# Patient Record
Sex: MALE | Race: WHITE | NOT HISPANIC OR LATINO | Employment: OTHER | URBAN - METROPOLITAN AREA
[De-identification: names, ages, dates, MRNs, and addresses within clinical notes are randomized per-mention and may not be internally consistent; named-entity substitution may affect disease eponyms.]

---

## 2017-03-01 ENCOUNTER — TELEPHONE (OUTPATIENT)
Dept: INTERNAL MEDICINE | Facility: CLINIC | Age: 67
End: 2017-03-01

## 2017-03-01 DIAGNOSIS — Z00.00 ANNUAL PHYSICAL EXAM: Primary | ICD-10-CM

## 2017-03-01 NOTE — TELEPHONE ENCOUNTER
----- Message from Melissa Varela sent at 3/1/2017 10:27 AM CST -----  Contact: fyi  Doctor appointment and lab have been scheduled.  Please link lab orders to the lab appointment.  Date of doctor appointment:  05/31/17  Physical or EP: epp   Date of lab appointment:  05/30/17  Comments:

## 2017-04-18 DIAGNOSIS — F41.9 ANXIETY: ICD-10-CM

## 2017-04-18 DIAGNOSIS — Z00.00 ANNUAL PHYSICAL EXAM: Primary | ICD-10-CM

## 2017-04-18 RX ORDER — CITALOPRAM 20 MG/1
TABLET, FILM COATED ORAL
Qty: 90 TABLET | Refills: 0 | Status: SHIPPED | OUTPATIENT
Start: 2017-04-18 | End: 2017-07-10 | Stop reason: SDUPTHER

## 2017-05-08 ENCOUNTER — TELEPHONE (OUTPATIENT)
Dept: INTERNAL MEDICINE | Facility: CLINIC | Age: 67
End: 2017-05-08

## 2017-05-08 ENCOUNTER — PATIENT MESSAGE (OUTPATIENT)
Dept: ADMINISTRATIVE | Facility: OTHER | Age: 67
End: 2017-05-08

## 2017-05-08 NOTE — TELEPHONE ENCOUNTER
----- Message from Shanda Anne sent at 5/8/2017  3:36 PM CDT -----  Contact: Self [phone]  Pt would like to speak with the nurse regarding his book put pt states he can not wait until the next available which is 06/13,Please call

## 2017-05-22 ENCOUNTER — LAB VISIT (OUTPATIENT)
Dept: LAB | Facility: HOSPITAL | Age: 67
End: 2017-05-22
Attending: INTERNAL MEDICINE
Payer: MEDICARE

## 2017-05-22 DIAGNOSIS — Z00.00 ANNUAL PHYSICAL EXAM: ICD-10-CM

## 2017-05-22 DIAGNOSIS — Z11.59 NEED FOR HEPATITIS C SCREENING TEST: ICD-10-CM

## 2017-05-22 LAB
ALBUMIN SERPL BCP-MCNC: 3.5 G/DL
ALP SERPL-CCNC: 56 U/L
ALT SERPL W/O P-5'-P-CCNC: 30 U/L
ANION GAP SERPL CALC-SCNC: 7 MMOL/L
AST SERPL-CCNC: 23 U/L
BASOPHILS # BLD AUTO: 0.04 K/UL
BASOPHILS NFR BLD: 0.8 %
BILIRUB SERPL-MCNC: 1 MG/DL
BUN SERPL-MCNC: 18 MG/DL
CALCIUM SERPL-MCNC: 9 MG/DL
CHLORIDE SERPL-SCNC: 108 MMOL/L
CHOLEST/HDLC SERPL: 4.2 {RATIO}
CO2 SERPL-SCNC: 28 MMOL/L
COMPLEXED PSA SERPL-MCNC: 4.7 NG/ML
CREAT SERPL-MCNC: 1.2 MG/DL
DIFFERENTIAL METHOD: NORMAL
EOSINOPHIL # BLD AUTO: 0.2 K/UL
EOSINOPHIL NFR BLD: 3.2 %
ERYTHROCYTE [DISTWIDTH] IN BLOOD BY AUTOMATED COUNT: 12.4 %
EST. GFR  (AFRICAN AMERICAN): >60 ML/MIN/1.73 M^2
EST. GFR  (NON AFRICAN AMERICAN): >60 ML/MIN/1.73 M^2
GLUCOSE SERPL-MCNC: 110 MG/DL
HCT VFR BLD AUTO: 43.4 %
HCV AB SERPL QL IA: NEGATIVE
HDL/CHOLESTEROL RATIO: 23.8 %
HDLC SERPL-MCNC: 189 MG/DL
HDLC SERPL-MCNC: 45 MG/DL
HGB BLD-MCNC: 14.5 G/DL
LDLC SERPL CALC-MCNC: 113.4 MG/DL
LYMPHOCYTES # BLD AUTO: 1.5 K/UL
LYMPHOCYTES NFR BLD: 27.6 %
MCH RBC QN AUTO: 30.7 PG
MCHC RBC AUTO-ENTMCNC: 33.4 %
MCV RBC AUTO: 92 FL
MONOCYTES # BLD AUTO: 0.5 K/UL
MONOCYTES NFR BLD: 9.6 %
NEUTROPHILS # BLD AUTO: 3.1 K/UL
NEUTROPHILS NFR BLD: 58.6 %
NONHDLC SERPL-MCNC: 144 MG/DL
PLATELET # BLD AUTO: 155 K/UL
PMV BLD AUTO: 11.5 FL
POTASSIUM SERPL-SCNC: 4.7 MMOL/L
PROT SERPL-MCNC: 6.7 G/DL
RBC # BLD AUTO: 4.72 M/UL
SODIUM SERPL-SCNC: 143 MMOL/L
TRIGL SERPL-MCNC: 153 MG/DL
TSH SERPL DL<=0.005 MIU/L-ACNC: 1.77 UIU/ML
WBC # BLD AUTO: 5.29 K/UL

## 2017-05-22 PROCEDURE — 86803 HEPATITIS C AB TEST: CPT

## 2017-05-22 PROCEDURE — 85025 COMPLETE CBC W/AUTO DIFF WBC: CPT

## 2017-05-22 PROCEDURE — 84153 ASSAY OF PSA TOTAL: CPT

## 2017-05-22 PROCEDURE — 80053 COMPREHEN METABOLIC PANEL: CPT

## 2017-05-22 PROCEDURE — 80061 LIPID PANEL: CPT

## 2017-05-22 PROCEDURE — 84443 ASSAY THYROID STIM HORMONE: CPT

## 2017-05-22 PROCEDURE — 36415 COLL VENOUS BLD VENIPUNCTURE: CPT | Mod: PO

## 2017-06-02 ENCOUNTER — OFFICE VISIT (OUTPATIENT)
Dept: INTERNAL MEDICINE | Facility: CLINIC | Age: 67
End: 2017-06-02
Payer: MEDICARE

## 2017-06-02 VITALS
HEIGHT: 72 IN | WEIGHT: 177.5 LBS | DIASTOLIC BLOOD PRESSURE: 76 MMHG | RESPIRATION RATE: 12 BRPM | BODY MASS INDEX: 24.04 KG/M2 | TEMPERATURE: 98 F | SYSTOLIC BLOOD PRESSURE: 104 MMHG | HEART RATE: 56 BPM

## 2017-06-02 DIAGNOSIS — Z00.00 ANNUAL PHYSICAL EXAM: Primary | ICD-10-CM

## 2017-06-02 DIAGNOSIS — F41.9 ANXIETY: ICD-10-CM

## 2017-06-02 DIAGNOSIS — R97.20 ELEVATED PSA: ICD-10-CM

## 2017-06-02 PROCEDURE — 99213 OFFICE O/P EST LOW 20 MIN: CPT | Mod: PBBFAC,PO | Performed by: INTERNAL MEDICINE

## 2017-06-02 PROCEDURE — 99397 PER PM REEVAL EST PAT 65+ YR: CPT | Mod: S$PBB,,, | Performed by: INTERNAL MEDICINE

## 2017-06-02 PROCEDURE — 99999 PR PBB SHADOW E&M-EST. PATIENT-LVL III: CPT | Mod: PBBFAC,,, | Performed by: INTERNAL MEDICINE

## 2017-06-02 PROCEDURE — 90670 PCV13 VACCINE IM: CPT | Mod: PBBFAC,PO

## 2017-06-02 NOTE — PROGRESS NOTES
Subjective:       Patient ID: Nilesh Almaraz is a 66 y.o. male.    Chief Complaint: Annual Exam    HPI   66 y.o. Male here for annual exam.      Cholesterol: (normal)  Vaccines: Influenza (2015); Tetanus (declined); PNA (2017);  Zoster (2015)  Eye exam: 2016  Colonoscopy: 5/17- repeat in 5 years     Exercise: cardio 2-3x/wk  Diet: regular     Past Medical History:    Allergy                                                       Anxiety                                                       Elevated PSA                                                Past Surgical History:    FRACTURE SURGERY                                                 Comment:left ankle  Social History    Marital Status:              Spouse Name: Palmira                Years of Education:                 Number of children: 2              Occupational History  Occupation          Employer            Comment               [employer]                                   Social History Main Topics    Smoking Status: Never Smoker                      Smokeless Status: Never Used                        Alcohol Use: Yes           1.2 - 1.8 oz/week       2-3 Glasses of wine per week    Drug Use: No              Sexual Activity: Yes               Partners with: Female      -- Penicillins     --  Other reaction(s): Hives  Mr. Almaraz does not currently have medications on file.     Review of Systems   Constitutional: Negative for activity change, appetite change, chills, diaphoresis, fatigue, fever and unexpected weight change.   HENT: Positive for hearing loss. Negative for congestion, mouth sores, postnasal drip, rhinorrhea, sinus pressure, sneezing, sore throat, trouble swallowing and voice change.    Eyes: Negative for discharge, itching and visual disturbance.   Respiratory: Negative for cough, chest tightness, shortness of breath and wheezing.    Cardiovascular: Negative for chest pain, palpitations and leg swelling.   Gastrointestinal:  Negative for abdominal pain, blood in stool, constipation, diarrhea, nausea and vomiting.   Endocrine: Negative for cold intolerance, heat intolerance, polydipsia and polyuria.   Genitourinary: Negative for difficulty urinating, dysuria, flank pain, hematuria and urgency.   Musculoskeletal: Positive for arthralgias. Negative for back pain, joint swelling, myalgias and neck pain.   Skin: Negative for rash and wound.   Allergic/Immunologic: Negative for environmental allergies and food allergies.   Neurological: Negative for dizziness, tremors, seizures, syncope, weakness and headaches.   Hematological: Negative for adenopathy. Does not bruise/bleed easily.   Psychiatric/Behavioral: Negative for confusion, dysphoric mood, self-injury, sleep disturbance and suicidal ideas. The patient is nervous/anxious.        Objective:      Physical Exam   Constitutional: He is oriented to person, place, and time. He appears well-developed and well-nourished. No distress.   HENT:   Head: Normocephalic and atraumatic.   Right Ear: External ear normal.   Left Ear: External ear normal.   Nose: Nose normal.   Mouth/Throat: Oropharynx is clear and moist. No oropharyngeal exudate.   Eyes: Conjunctivae and EOM are normal. Pupils are equal, round, and reactive to light. Right eye exhibits no discharge. Left eye exhibits no discharge. No scleral icterus.   Neck: Normal range of motion. Neck supple. No JVD present. No thyromegaly present.   Cardiovascular: Normal rate, regular rhythm, normal heart sounds and intact distal pulses.    No murmur heard.  Pulmonary/Chest: Effort normal and breath sounds normal. No respiratory distress. He has no wheezes. He has no rales.   Abdominal: Soft. Bowel sounds are normal. He exhibits no distension. There is no tenderness. There is no guarding.   Musculoskeletal: He exhibits no edema.   Lymphadenopathy:     He has no cervical adenopathy.   Neurological: He is alert and oriented to person, place, and time.  He has normal reflexes. No cranial nerve deficit. Coordination normal.   Skin: Skin is warm and dry. No rash noted. He is not diaphoretic. No pallor.   Psychiatric: He has a normal mood and affect. Judgment normal.       Assessment:       1. Annual physical exam    2. Anxiety    3. Elevated PSA        Plan:    1. Blood work reviewed with pt and is satisfactory       Vaccines: Influenza (2015); Tetanus (declined); PNA (2017); Zoster (2015)       Eye exam: 2016       Colonoscopy: 5/17- repeat in 5 years   2. Anxiety- controlled on Celexa 20 mg daily   3. Elevated PSA- followed by Urology       Hx of negative Bx in 2012   4. F/u in 1 yr or PRN

## 2017-07-10 DIAGNOSIS — F41.9 ANXIETY: ICD-10-CM

## 2017-07-10 RX ORDER — CITALOPRAM 20 MG/1
TABLET, FILM COATED ORAL
Qty: 30 TABLET | Refills: 11 | Status: SHIPPED | OUTPATIENT
Start: 2017-07-10 | End: 2017-07-12 | Stop reason: SDUPTHER

## 2017-07-12 DIAGNOSIS — F41.9 ANXIETY: ICD-10-CM

## 2017-07-12 RX ORDER — CITALOPRAM 20 MG/1
TABLET, FILM COATED ORAL
Qty: 90 TABLET | Refills: 3 | Status: SHIPPED | OUTPATIENT
Start: 2017-07-12 | End: 2018-10-01 | Stop reason: SDUPTHER

## 2017-12-04 ENCOUNTER — CLINICAL SUPPORT (OUTPATIENT)
Dept: INFECTIOUS DISEASES | Facility: CLINIC | Age: 67
End: 2017-12-04
Payer: MEDICARE

## 2017-12-04 ENCOUNTER — OFFICE VISIT (OUTPATIENT)
Dept: INFECTIOUS DISEASES | Facility: CLINIC | Age: 67
End: 2017-12-04
Payer: MEDICARE

## 2017-12-04 VITALS
TEMPERATURE: 98 F | HEART RATE: 60 BPM | DIASTOLIC BLOOD PRESSURE: 69 MMHG | WEIGHT: 177.69 LBS | HEIGHT: 72 IN | BODY MASS INDEX: 24.07 KG/M2 | SYSTOLIC BLOOD PRESSURE: 109 MMHG

## 2017-12-04 DIAGNOSIS — Z23 IMMUNIZATION DUE: ICD-10-CM

## 2017-12-04 DIAGNOSIS — Z71.84 TRAVEL ADVICE ENCOUNTER: Primary | ICD-10-CM

## 2017-12-04 DIAGNOSIS — Z71.84 TRAVEL ADVICE ENCOUNTER: ICD-10-CM

## 2017-12-04 PROCEDURE — 90717 YELLOW FEVER VACCINE SUBQ: CPT | Mod: PBBFAC

## 2017-12-04 PROCEDURE — 99213 OFFICE O/P EST LOW 20 MIN: CPT | Mod: PBBFAC,27,25 | Performed by: INTERNAL MEDICINE

## 2017-12-04 PROCEDURE — 99402 PREV MED CNSL INDIV APPRX 30: CPT | Mod: S$PBB,,, | Performed by: INTERNAL MEDICINE

## 2017-12-04 PROCEDURE — 90472 IMMUNIZATION ADMIN EACH ADD: CPT | Mod: PBBFAC

## 2017-12-04 PROCEDURE — 99999 PR PBB SHADOW E&M-EST. PATIENT-LVL I: CPT | Mod: PBBFAC,,,

## 2017-12-04 PROCEDURE — 90691 TYPHOID VACCINE IM: CPT | Mod: PBBFAC

## 2017-12-04 PROCEDURE — 99999 PR PBB SHADOW E&M-EST. PATIENT-LVL III: CPT | Mod: PBBFAC,,, | Performed by: INTERNAL MEDICINE

## 2017-12-04 PROCEDURE — 90632 HEPA VACCINE ADULT IM: CPT | Mod: PBBFAC

## 2017-12-04 PROCEDURE — 90714 TD VACC NO PRESV 7 YRS+ IM: CPT | Mod: PBBFAC

## 2017-12-04 PROCEDURE — 99211 OFF/OP EST MAY X REQ PHY/QHP: CPT | Mod: PBBFAC

## 2017-12-04 RX ORDER — AZITHROMYCIN 500 MG/1
TABLET, FILM COATED ORAL
Qty: 2 TABLET | Refills: 0 | Status: SHIPPED | OUTPATIENT
Start: 2017-12-04 | End: 2018-11-14 | Stop reason: ALTCHOICE

## 2017-12-04 RX ORDER — ATOVAQUONE AND PROGUANIL HYDROCHLORIDE 250; 100 MG/1; MG/1
TABLET, FILM COATED ORAL
Qty: 11 TABLET | Refills: 0 | Status: SHIPPED | OUTPATIENT
Start: 2017-12-04 | End: 2018-11-14 | Stop reason: ALTCHOICE

## 2017-12-04 RX ORDER — SCOLOPAMINE TRANSDERMAL SYSTEM 1 MG/1
1 PATCH, EXTENDED RELEASE TRANSDERMAL
Qty: 3 PATCH | Refills: 0 | Status: SHIPPED | OUTPATIENT
Start: 2017-12-04 | End: 2018-01-03

## 2017-12-04 NOTE — PROGRESS NOTES
Patient received PF Td, Hep A, Typhoid Vi, and Stamaril yellow fever w/yellow card documentation. Tolerated well and left in NAD

## 2018-05-10 ENCOUNTER — OFFICE VISIT (OUTPATIENT)
Dept: URGENT CARE | Facility: CLINIC | Age: 68
End: 2018-05-10
Payer: MEDICARE

## 2018-05-10 VITALS
HEART RATE: 60 BPM | HEIGHT: 72 IN | WEIGHT: 171 LBS | OXYGEN SATURATION: 97 % | BODY MASS INDEX: 23.16 KG/M2 | TEMPERATURE: 99 F | DIASTOLIC BLOOD PRESSURE: 73 MMHG | RESPIRATION RATE: 18 BRPM | SYSTOLIC BLOOD PRESSURE: 120 MMHG

## 2018-05-10 DIAGNOSIS — B96.89 ACUTE BACTERIAL SINUSITIS: Primary | ICD-10-CM

## 2018-05-10 DIAGNOSIS — J01.90 ACUTE BACTERIAL SINUSITIS: Primary | ICD-10-CM

## 2018-05-10 PROCEDURE — 96372 THER/PROPH/DIAG INJ SC/IM: CPT | Mod: S$GLB,,, | Performed by: INTERNAL MEDICINE

## 2018-05-10 PROCEDURE — 99213 OFFICE O/P EST LOW 20 MIN: CPT | Mod: 25,S$GLB,, | Performed by: INTERNAL MEDICINE

## 2018-05-10 RX ORDER — DOXYCYCLINE 100 MG/1
100 CAPSULE ORAL 2 TIMES DAILY
Qty: 20 CAPSULE | Refills: 0 | Status: SHIPPED | OUTPATIENT
Start: 2018-05-10 | End: 2018-05-20

## 2018-05-10 RX ORDER — BETAMETHASONE SODIUM PHOSPHATE AND BETAMETHASONE ACETATE 3; 3 MG/ML; MG/ML
9 INJECTION, SUSPENSION INTRA-ARTICULAR; INTRALESIONAL; INTRAMUSCULAR; SOFT TISSUE ONCE
Status: COMPLETED | OUTPATIENT
Start: 2018-05-10 | End: 2018-05-10

## 2018-05-10 RX ADMIN — BETAMETHASONE SODIUM PHOSPHATE AND BETAMETHASONE ACETATE 9 MG: 3; 3 INJECTION, SUSPENSION INTRA-ARTICULAR; INTRALESIONAL; INTRAMUSCULAR; SOFT TISSUE at 10:05

## 2018-05-10 NOTE — PROGRESS NOTES
Subjective:       Patient ID: Nilesh Almaraz is a 67 y.o. male.    Vitals:  height is 6' (1.829 m) and weight is 77.6 kg (171 lb). His temperature is 98.9 °F (37.2 °C). His blood pressure is 120/73 and his pulse is 60. His respiration is 18 and oxygen saturation is 97%.     Chief Complaint: Sinus Problem (Started 2 weeks ago. with coughing semi productive, sore throat and ear pain )    Sinus Problem   This is a new problem. The current episode started 1 to 4 weeks ago. The problem is unchanged. There has been no fever. Associated symptoms include coughing, ear pain, headaches and a sore throat. Pertinent negatives include no chills, congestion, hoarse voice or shortness of breath. The treatment provided no relief.     Review of Systems   Constitution: Negative for chills, fever and malaise/fatigue.   HENT: Positive for ear pain, hearing loss and sore throat. Negative for congestion and hoarse voice.    Eyes: Negative for discharge and redness.   Cardiovascular: Negative for chest pain, dyspnea on exertion and leg swelling.   Respiratory: Positive for cough and sputum production. Negative for shortness of breath and wheezing.    Musculoskeletal: Negative for myalgias.   Gastrointestinal: Negative for abdominal pain and nausea.   Neurological: Positive for headaches.       Objective:      Physical Exam   Constitutional: He appears well-developed and well-nourished.   HENT:   Head: Normocephalic and atraumatic.   Nose: Mucosal edema (purulent mucous) present. Right sinus exhibits maxillary sinus tenderness. Left sinus exhibits maxillary sinus tenderness.   Eyes: Conjunctivae and EOM are normal. Pupils are equal, round, and reactive to light.   Neck: Normal range of motion. Neck supple.   Cardiovascular: Normal rate and regular rhythm.    Pulmonary/Chest: Effort normal and breath sounds normal.   Nursing note and vitals reviewed.      Assessment:       1. Acute bacterial sinusitis        Plan:         Acute bacterial  sinusitis  -     betamethasone acetate-betamethasone sodium phosphate injection 9 mg; Inject 1.5 mLs (9 mg total) into the muscle once.  -     doxycycline (VIBRAMYCIN) 100 MG Cap; Take 1 capsule (100 mg total) by mouth 2 (two) times daily.  Dispense: 20 capsule; Refill: 0

## 2018-07-02 ENCOUNTER — PATIENT MESSAGE (OUTPATIENT)
Dept: ADMINISTRATIVE | Facility: HOSPITAL | Age: 68
End: 2018-07-02

## 2018-07-13 ENCOUNTER — CLINICAL SUPPORT (OUTPATIENT)
Dept: INFECTIOUS DISEASES | Facility: CLINIC | Age: 68
End: 2018-07-13
Payer: MEDICARE

## 2018-07-13 DIAGNOSIS — Z71.84 TRAVEL ADVICE ENCOUNTER: ICD-10-CM

## 2018-07-13 DIAGNOSIS — Z23 IMMUNIZATION DUE: ICD-10-CM

## 2018-07-13 PROCEDURE — 99999 PR PBB SHADOW E&M-EST. PATIENT-LVL I: CPT | Mod: PBBFAC,,,

## 2018-07-13 PROCEDURE — 90632 HEPA VACCINE ADULT IM: CPT | Mod: PBBFAC

## 2018-07-13 PROCEDURE — 99211 OFF/OP EST MAY X REQ PHY/QHP: CPT | Mod: PBBFAC

## 2018-10-01 DIAGNOSIS — F41.9 ANXIETY: ICD-10-CM

## 2018-10-01 RX ORDER — CITALOPRAM 20 MG/1
20 TABLET, FILM COATED ORAL DAILY
Qty: 90 TABLET | Refills: 0 | Status: SHIPPED | OUTPATIENT
Start: 2018-10-01 | End: 2018-12-08 | Stop reason: SDUPTHER

## 2018-10-01 NOTE — LETTER
October 1, 2018    Nilesh Almaraz  [address] [address]             [facility]  [address] [facility] [address]  Phone: [phone]  Fax: [phone] Dear Mr. Almaraz:    We recently refilled your Celexa. Dr. Aguero says you are due for your Annual visit with fasting labs . Please call to schedule @ [phone], phone staff would be happy to assist you.    If you have any questions or concerns, please don't hesitate to call.    Sincerely,    Ke Aguero DO  [facility]

## 2018-10-10 ENCOUNTER — PATIENT MESSAGE (OUTPATIENT)
Dept: INTERNAL MEDICINE | Facility: CLINIC | Age: 68
End: 2018-10-10

## 2018-10-11 ENCOUNTER — TELEPHONE (OUTPATIENT)
Dept: INTERNAL MEDICINE | Facility: CLINIC | Age: 68
End: 2018-10-11

## 2018-10-11 DIAGNOSIS — E78.5 DYSLIPIDEMIA: ICD-10-CM

## 2018-10-11 DIAGNOSIS — R53.83 FATIGUE, UNSPECIFIED TYPE: Primary | ICD-10-CM

## 2018-10-11 DIAGNOSIS — Z12.5 PROSTATE CANCER SCREENING: ICD-10-CM

## 2018-10-11 NOTE — TELEPHONE ENCOUNTER
----- Message from Loulou Aly sent at 10/11/2018 11:13 AM CDT -----  Doctor appointment and lab have been scheduled.  Please link lab orders to the lab appointment.  Date of doctor appointment:11/14    Physical or EP:  Physical  Date of lab appointment:  11/07  Comments:

## 2018-10-11 NOTE — TELEPHONE ENCOUNTER
"----- Message from Veronica Brower sent at 10/11/2018  9:28 AM CDT -----  Contact: Self [phone]  RX request - refill or new RX.  Is this a refill or new RX:  refill  RX name and strength: Citalopram 20mg  Directions:   Is this a 30 day or 90 day RX:  9 pills  Local pharmacy or mail order pharmacy:    Pharmacy name and phone # (DON'T enter "on file" or "in chart"): [facility]/pharmacy #[id number] [address] [phone] (Phone)  [phone] (Fax)  Comments:          "

## 2018-11-08 ENCOUNTER — LAB VISIT (OUTPATIENT)
Dept: LAB | Facility: HOSPITAL | Age: 68
End: 2018-11-08
Attending: INTERNAL MEDICINE
Payer: MEDICARE

## 2018-11-08 DIAGNOSIS — R53.83 FATIGUE, UNSPECIFIED TYPE: ICD-10-CM

## 2018-11-08 DIAGNOSIS — Z12.5 PROSTATE CANCER SCREENING: ICD-10-CM

## 2018-11-08 DIAGNOSIS — E78.5 DYSLIPIDEMIA: ICD-10-CM

## 2018-11-08 LAB
ALBUMIN SERPL BCP-MCNC: 3.7 G/DL
ALP SERPL-CCNC: 60 U/L
ALT SERPL W/O P-5'-P-CCNC: 43 U/L
ANION GAP SERPL CALC-SCNC: 5 MMOL/L
AST SERPL-CCNC: 29 U/L
BASOPHILS # BLD AUTO: 0.04 K/UL
BASOPHILS NFR BLD: 0.8 %
BILIRUB SERPL-MCNC: 0.8 MG/DL
BUN SERPL-MCNC: 14 MG/DL
CALCIUM SERPL-MCNC: 9.5 MG/DL
CHLORIDE SERPL-SCNC: 105 MMOL/L
CHOLEST SERPL-MCNC: 223 MG/DL
CHOLEST/HDLC SERPL: 4.1 {RATIO}
CO2 SERPL-SCNC: 30 MMOL/L
COMPLEXED PSA SERPL-MCNC: 7 NG/ML
CREAT SERPL-MCNC: 1 MG/DL
DIFFERENTIAL METHOD: NORMAL
EOSINOPHIL # BLD AUTO: 0.1 K/UL
EOSINOPHIL NFR BLD: 2 %
ERYTHROCYTE [DISTWIDTH] IN BLOOD BY AUTOMATED COUNT: 12.2 %
EST. GFR  (AFRICAN AMERICAN): >60 ML/MIN/1.73 M^2
EST. GFR  (NON AFRICAN AMERICAN): >60 ML/MIN/1.73 M^2
GLUCOSE SERPL-MCNC: 107 MG/DL
HCT VFR BLD AUTO: 44.6 %
HDLC SERPL-MCNC: 54 MG/DL
HDLC SERPL: 24.2 %
HGB BLD-MCNC: 14.8 G/DL
IMM GRANULOCYTES # BLD AUTO: 0.01 K/UL
IMM GRANULOCYTES NFR BLD AUTO: 0.2 %
LDLC SERPL CALC-MCNC: 135.2 MG/DL
LYMPHOCYTES # BLD AUTO: 1.5 K/UL
LYMPHOCYTES NFR BLD: 29.6 %
MCH RBC QN AUTO: 30.3 PG
MCHC RBC AUTO-ENTMCNC: 33.2 G/DL
MCV RBC AUTO: 91 FL
MONOCYTES # BLD AUTO: 0.4 K/UL
MONOCYTES NFR BLD: 8.9 %
NEUTROPHILS # BLD AUTO: 2.9 K/UL
NEUTROPHILS NFR BLD: 58.5 %
NONHDLC SERPL-MCNC: 169 MG/DL
NRBC BLD-RTO: 0 /100 WBC
PLATELET # BLD AUTO: 171 K/UL
PMV BLD AUTO: 11 FL
POTASSIUM SERPL-SCNC: 4.5 MMOL/L
PROT SERPL-MCNC: 7.3 G/DL
RBC # BLD AUTO: 4.89 M/UL
SODIUM SERPL-SCNC: 140 MMOL/L
TRIGL SERPL-MCNC: 169 MG/DL
TSH SERPL DL<=0.005 MIU/L-ACNC: 1.61 UIU/ML
WBC # BLD AUTO: 4.93 K/UL

## 2018-11-08 PROCEDURE — 85025 COMPLETE CBC W/AUTO DIFF WBC: CPT

## 2018-11-08 PROCEDURE — 80053 COMPREHEN METABOLIC PANEL: CPT

## 2018-11-08 PROCEDURE — 84443 ASSAY THYROID STIM HORMONE: CPT

## 2018-11-08 PROCEDURE — 36415 COLL VENOUS BLD VENIPUNCTURE: CPT | Mod: PO

## 2018-11-08 PROCEDURE — 84153 ASSAY OF PSA TOTAL: CPT

## 2018-11-08 PROCEDURE — 80061 LIPID PANEL: CPT

## 2018-11-14 ENCOUNTER — OFFICE VISIT (OUTPATIENT)
Dept: INTERNAL MEDICINE | Facility: CLINIC | Age: 68
End: 2018-11-14
Payer: MEDICARE

## 2018-11-14 VITALS
SYSTOLIC BLOOD PRESSURE: 118 MMHG | WEIGHT: 169.75 LBS | DIASTOLIC BLOOD PRESSURE: 78 MMHG | HEART RATE: 64 BPM | RESPIRATION RATE: 16 BRPM | TEMPERATURE: 99 F | BODY MASS INDEX: 22.99 KG/M2 | HEIGHT: 72 IN

## 2018-11-14 DIAGNOSIS — N40.1 BENIGN PROSTATIC HYPERPLASIA WITH WEAK URINARY STREAM: ICD-10-CM

## 2018-11-14 DIAGNOSIS — R39.12 BENIGN PROSTATIC HYPERPLASIA WITH WEAK URINARY STREAM: ICD-10-CM

## 2018-11-14 DIAGNOSIS — Z00.00 ANNUAL PHYSICAL EXAM: Primary | ICD-10-CM

## 2018-11-14 DIAGNOSIS — R97.20 ELEVATED PSA: ICD-10-CM

## 2018-11-14 DIAGNOSIS — F41.9 ANXIETY: ICD-10-CM

## 2018-11-14 PROCEDURE — 90732 PPSV23 VACC 2 YRS+ SUBQ/IM: CPT | Mod: PBBFAC,PO

## 2018-11-14 PROCEDURE — 99999 PR PBB SHADOW E&M-EST. PATIENT-LVL III: CPT | Mod: PBBFAC,,, | Performed by: INTERNAL MEDICINE

## 2018-11-14 PROCEDURE — 90662 IIV NO PRSV INCREASED AG IM: CPT | Mod: PBBFAC,PO

## 2018-11-14 PROCEDURE — 99213 OFFICE O/P EST LOW 20 MIN: CPT | Mod: PBBFAC,PO,25 | Performed by: INTERNAL MEDICINE

## 2018-11-14 PROCEDURE — 99214 OFFICE O/P EST MOD 30 MIN: CPT | Mod: S$PBB,,, | Performed by: INTERNAL MEDICINE

## 2018-11-14 RX ORDER — TAMSULOSIN HYDROCHLORIDE 0.4 MG/1
0.4 CAPSULE ORAL DAILY
Qty: 90 CAPSULE | Refills: 3 | Status: SHIPPED | OUTPATIENT
Start: 2018-11-14 | End: 2019-06-06 | Stop reason: SDUPTHER

## 2018-11-14 NOTE — PROGRESS NOTES
Subjective:       Patient ID: Nilesh Almaraz is a 67 y.o. male.    Chief Complaint: Annual Exam (see labs to review,  0 pain today.)    HPI   67 y.o. Male here for annual exam.      Vaccines: Influenza (2018); Tetanus (declined); Prevnar (2017); Pneumococcal (2018); Zoster (2015)  Eye exam: 2018  Colonoscopy: 5/17- repeat in 5 years     Exercise: cardio 2-3x/wk  Diet: regular     Past Medical History:    Allergy                                                       Anxiety                                                       Elevated PSA                                                Past Surgical History:    FRACTURE SURGERY                                                 Comment:left ankle  Social History    Marital Status:              Spouse Name: Palmira                Years of Education:                 Number of children: 2              Occupational History  Occupation          Employer            Comment               [employer]                                   Social History Main Topics    Smoking Status: Never Smoker                      Smokeless Status: Never Used                        Alcohol Use: Yes           1.2 - 1.8 oz/week       2-3 Glasses of wine per week    Drug Use: No              Sexual Activity: Yes               Partners with: Female      -- Penicillins     --  Other reaction(s): Hives  Review of Systems   Constitutional: Negative for activity change, appetite change, chills, diaphoresis, fatigue, fever and unexpected weight change.   HENT: Negative for congestion, mouth sores, postnasal drip, rhinorrhea, sinus pressure, sneezing, sore throat, trouble swallowing and voice change.    Eyes: Negative for discharge, itching and visual disturbance.   Respiratory: Negative for cough, chest tightness, shortness of breath and wheezing.    Cardiovascular: Negative for chest pain, palpitations and leg swelling.   Gastrointestinal: Negative for abdominal pain, blood in stool,  constipation, diarrhea, nausea and vomiting.   Endocrine: Negative for cold intolerance and heat intolerance.   Genitourinary: Positive for decreased urine volume. Negative for difficulty urinating, dysuria, flank pain, hematuria and urgency.   Musculoskeletal: Negative for arthralgias, back pain, myalgias and neck pain.   Skin: Negative for rash and wound.   Allergic/Immunologic: Negative for environmental allergies and food allergies.   Neurological: Negative for dizziness, tremors, seizures, syncope, weakness and headaches.   Hematological: Negative for adenopathy. Does not bruise/bleed easily.   Psychiatric/Behavioral: Negative for confusion, sleep disturbance and suicidal ideas. The patient is not nervous/anxious.        Objective:      Physical Exam   Constitutional: He is oriented to person, place, and time. He appears well-developed and well-nourished. No distress.   HENT:   Head: Normocephalic and atraumatic.   Right Ear: External ear normal.   Left Ear: External ear normal.   Nose: Nose normal.   Mouth/Throat: Oropharynx is clear and moist. No oropharyngeal exudate.   Eyes: Conjunctivae and EOM are normal. Pupils are equal, round, and reactive to light. Right eye exhibits no discharge. Left eye exhibits no discharge. No scleral icterus.   Neck: Normal range of motion. Neck supple. No JVD present. No thyromegaly present.   Cardiovascular: Normal rate, regular rhythm, normal heart sounds and intact distal pulses.   No murmur heard.  Pulmonary/Chest: Effort normal and breath sounds normal. No respiratory distress. He has no wheezes. He has no rales.   Abdominal: Soft. Bowel sounds are normal. He exhibits no distension. There is no tenderness. There is no guarding.   Musculoskeletal: He exhibits no edema.   Lymphadenopathy:     He has no cervical adenopathy.   Neurological: He is alert and oriented to person, place, and time. No cranial nerve deficit. Coordination normal.   Skin: Skin is warm and dry. No rash  noted. He is not diaphoretic. No pallor.   Psychiatric: He has a normal mood and affect. Judgment normal.       Assessment:       1. Annual physical exam    2. Anxiety    3. Elevated PSA    4. Benign prostatic hyperplasia with weak urinary stream        Plan:    1. Blood work reviewed with pt and is satisfactory       Vaccines: Influenza (2018); Tetanus (declined); Prevnar (2017); Pneumococcal (2018); Zoster (2015)       Eye exam: 2018       Colonoscopy: 5/17- repeat in 5 years   2. Anxiety- controlled on Celexa 20 mg daily   3. Elevated PSA- pt has seen Urology       Hx of negative Bx in 2012   4. Rx Flomax 0.4 mg daily    5. F/u in 1 yr or PRN

## 2018-11-15 ENCOUNTER — OFFICE VISIT (OUTPATIENT)
Dept: UROLOGY | Facility: CLINIC | Age: 68
End: 2018-11-15
Payer: MEDICARE

## 2018-11-15 VITALS
SYSTOLIC BLOOD PRESSURE: 119 MMHG | HEART RATE: 66 BPM | WEIGHT: 169.75 LBS | HEIGHT: 72 IN | DIASTOLIC BLOOD PRESSURE: 69 MMHG | BODY MASS INDEX: 22.99 KG/M2

## 2018-11-15 DIAGNOSIS — D49.59 NEOPLASM OF PROSTATE: ICD-10-CM

## 2018-11-15 DIAGNOSIS — R97.20 ELEVATED PSA: Primary | ICD-10-CM

## 2018-11-15 PROCEDURE — 99213 OFFICE O/P EST LOW 20 MIN: CPT | Mod: PBBFAC | Performed by: UROLOGY

## 2018-11-15 PROCEDURE — 99214 OFFICE O/P EST MOD 30 MIN: CPT | Mod: S$PBB,,, | Performed by: UROLOGY

## 2018-11-15 PROCEDURE — 99999 PR PBB SHADOW E&M-EST. PATIENT-LVL III: CPT | Mod: PBBFAC,,, | Performed by: UROLOGY

## 2018-11-15 NOTE — LETTER
November 15, 2018      Ke Aguero, DO  [address] [facility] [place] [address]           [facility]  [address]  [place] [address]  Phone: [phone]          Patient: Nilesh Almaraz   MR Number: [id number]   YOB: 1950   Date of Visit: 11/15/2018       Dear Dr. Ke Aguero:    Thank you for referring Nilesh Almaraz to me for evaluation. Attached you will find relevant portions of my assessment and plan of care.    If you have questions, please do not hesitate to call me. I look forward to following Nilesh Almaraz along with you.    Sincerely,    Roman Mcdonough MD    Enclosure  CC:  No Recipients    If you would like to receive this communication electronically, please contact [email or web address] or [phone] to request more information on [facility] access.    For providers and/or their staff who would like to refer a patient to [facility], please contact us through our one-stop-shop provider referral line, [facility] , at [phone].    If you feel you have received this communication in error or would no longer like to receive these types of communications, please e-mail [email or web address]

## 2018-11-15 NOTE — PROGRESS NOTES
Clinic Note  11/15/2018      Subjective:         Chief Complaint:   HPI  Nilesh Almaraz is a 67 y.o. male with an elevated PSA. Consult from Dr. Aguero.  I did biopsy 2/9/2012- B9 path with acute and chronic inflammation(PSA 3.5).  Negative family history. 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 Has place in [place].    NIH risk tool:        Lab Results   Component Value Date    PSA 7.0 (H) 11/08/2018    PSA 4.7 (H) 05/22/2017    PSA 6.8 (H) 03/29/2016    PSA 5.0 (H) 10/16/2013    PSA 3.52 09/27/2012    PSA 3.4 08/04/2011    PSA 4.5 (H) 07/20/2011    PSADIAG 4.3 (H) 10/03/2016    PSATOTAL 3.5 08/04/2011    PSAFREE 0.83 08/04/2011    PSAFREEPCT 23.71 08/04/2011      Past Medical History:   Diagnosis Date    Allergy     Anxiety     BPH (benign prostatic hypertrophy)     Elevated PSA     Prostatism     Skin cancer      Family History   Problem Relation Age of Onset    Cancer Brother         osteo sarcoma    Diabetes Mother     Colon polyps Neg Hx     Heart disease Neg Hx     Amblyopia Neg Hx     Blindness Neg Hx     Cataracts Neg Hx     Glaucoma Neg Hx     Hypertension Neg Hx     Macular degeneration Neg Hx     Retinal detachment Neg Hx     Strabismus Neg Hx     Stroke Neg Hx     Thyroid disease Neg Hx      Social History     Socioeconomic History    Marital status:      Spouse name: Palmira    Number of children: 2    Years of education: Not on file    Highest education level: Not on file   Social Needs    Financial resource strain: Not on file    Food insecurity - worry: Not on file    Food insecurity - inability: Not on file    Transportation needs - medical: Not on file    Transportation needs - non-medical: Not on file   Occupational History    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     Employer: [employer]   Tobacco Use    Smoking status: Never Smoker    Smokeless tobacco: Never Used   Substance and Sexual Activity    Alcohol use: Yes     Alcohol/week: 1.2 - 1.8 oz      Types: 2 - 3 Glasses of wine per week     Comment: daily    Drug use: No    Sexual activity: Yes     Partners: Female   Other Topics Concern    Not on file   Social History Narrative    Not on file     Past Surgical History:   Procedure Laterality Date    FRACTURE SURGERY      left ankle    HERNIA REPAIR      hydrocele      SKIN LESION EXCISION       Patient Active Problem List   Diagnosis    Anxiety    History of elevated PSA    Elevated PSA     Review of Systems   Constitutional: Negative for appetite change, chills, fatigue, fever and unexpected weight change.   HENT: Negative for nosebleeds.    Respiratory: Negative for shortness of breath and wheezing.    Cardiovascular: Negative for chest pain, palpitations and leg swelling.   Gastrointestinal: Negative for abdominal distention, abdominal pain, constipation, diarrhea, nausea and vomiting.   Genitourinary: Positive for difficulty urinating. Negative for dysuria and hematuria.        Decreased FOS   Musculoskeletal: Negative for arthralgias and back pain.   Skin: Negative for pallor.   Neurological: Negative for dizziness, seizures and syncope.   Hematological: Negative for adenopathy.   Psychiatric/Behavioral: Negative for dysphoric mood.         Objective:      There were no vitals taken for this visit.  Estimated body mass index is 23.02 kg/m² as calculated from the following:    Height as of 11/14/18: 6' (1.829 m).    Weight as of 11/14/18: 77 kg (169 lb 12.1 oz).  Physical Exam   Constitutional: He is oriented to person, place, and time. He appears well-developed and well-nourished. No distress.   HENT:   Head: Atraumatic.   Neck: No tracheal deviation present.   Cardiovascular: Normal rate.    Pulmonary/Chest: Effort normal. No respiratory distress. He has no wheezes.   Abdominal: Soft. Bowel sounds are normal. He exhibits no distension and no mass. There is no tenderness. There is no rebound and no guarding.   Genitourinary: Rectum normal  and prostate normal. Rectal exam shows no external hemorrhoid, no internal hemorrhoid, no fissure, no mass and no tenderness.   Neurological: He is alert and oriented to person, place, and time.   Skin: Skin is warm and dry. He is not diaphoretic.     Psychiatric: He has a normal mood and affect. His behavior is normal. Judgment and thought content normal.         Assessment and Plan:           Problem List Items Addressed This Visit     Elevated PSA - Primary          Follow up:   Discussed PSA, NIH risk. Discussed surveillance, mp MRI, TRUS biopsy.  Recommend mp MRI.    Roman Mcdonough

## 2018-11-26 ENCOUNTER — HOSPITAL ENCOUNTER (OUTPATIENT)
Dept: RADIOLOGY | Facility: HOSPITAL | Age: 68
Discharge: HOME | End: 2018-11-26
Attending: UROLOGY
Payer: MEDICARE

## 2018-11-26 DIAGNOSIS — D49.59 NEOPLASM OF PROSTATE: ICD-10-CM

## 2018-11-26 PROCEDURE — 72197 MRI PELVIS W/O & W/DYE: CPT | Mod: TC

## 2018-11-26 PROCEDURE — A9585 GADOBUTROL INJECTION: HCPCS | Performed by: UROLOGY

## 2018-11-26 PROCEDURE — 72197 MRI PELVIS W/O & W/DYE: CPT | Mod: 26,,, | Performed by: RADIOLOGY

## 2018-11-26 PROCEDURE — 25500020 PHARM REV CODE 255: Performed by: UROLOGY

## 2018-11-26 RX ORDER — GADOBUTROL 604.72 MG/ML
10 INJECTION INTRAVENOUS
Status: COMPLETED | OUTPATIENT
Start: 2018-11-26 | End: 2018-11-26

## 2018-11-26 RX ADMIN — GADOBUTROL 10 ML: 604.72 INJECTION INTRAVENOUS at 03:11

## 2018-11-27 ENCOUNTER — OFFICE VISIT (OUTPATIENT)
Dept: UROLOGY | Facility: CLINIC | Age: 68
End: 2018-11-27
Payer: MEDICARE

## 2018-11-27 VITALS
DIASTOLIC BLOOD PRESSURE: 66 MMHG | SYSTOLIC BLOOD PRESSURE: 136 MMHG | WEIGHT: 163.13 LBS | BODY MASS INDEX: 22.13 KG/M2 | HEART RATE: 57 BPM

## 2018-11-27 DIAGNOSIS — R97.20 ELEVATED PSA: Primary | ICD-10-CM

## 2018-11-27 PROCEDURE — 99213 OFFICE O/P EST LOW 20 MIN: CPT | Mod: PBBFAC | Performed by: UROLOGY

## 2018-11-27 PROCEDURE — 99213 OFFICE O/P EST LOW 20 MIN: CPT | Mod: S$PBB,,, | Performed by: UROLOGY

## 2018-11-27 PROCEDURE — 99999 PR PBB SHADOW E&M-EST. PATIENT-LVL III: CPT | Mod: PBBFAC,,, | Performed by: UROLOGY

## 2018-11-27 NOTE — PROGRESS NOTES
Clinic Note  11/27/2018      Subjective:         Chief Complaint:   HPI  Nilesh Almaraz is a 67 y.o. male  with an elevated PSA. Consult from Dr. Aguero.  I did biopsy 2/9/2012- B9 path with acute and chronic inflammation(PSA 3.5).  Negative family history. 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  On flomax for BPH since mid November has noted improvement in stream.     NIH risk tool:     MRI: No suspicious PI-RADS 4 or 5 lesions. Volume- 52 ccs.Report:  PIRADS 2 lesion at the left posteromedial prostate base peripheral zone measuring approximately 1.7 cm.      Lab Results   Component Value Date    PSA 7.0 (H) 11/08/2018    PSA 4.7 (H) 05/22/2017    PSA 6.8 (H) 03/29/2016    PSA 5.0 (H) 10/16/2013    PSA 3.52 09/27/2012    PSA 3.4 08/04/2011    PSA 4.5 (H) 07/20/2011    PSADIAG 4.3 (H) 10/03/2016    PSATOTAL 3.5 08/04/2011    PSAFREE 0.83 08/04/2011    PSAFREEPCT 23.71 08/04/2011      Past Medical History:   Diagnosis Date    Allergy     Anxiety     BPH (benign prostatic hypertrophy)     Elevated PSA     Prostatism     Skin cancer      Family History   Problem Relation Age of Onset    Cancer Brother         osteo sarcoma    Diabetes Mother     Colon polyps Neg Hx     Heart disease Neg Hx     Amblyopia Neg Hx     Blindness Neg Hx     Cataracts Neg Hx     Glaucoma Neg Hx     Hypertension Neg Hx     Macular degeneration Neg Hx     Retinal detachment Neg Hx     Strabismus Neg Hx     Stroke Neg Hx     Thyroid disease Neg Hx      Social History     Socioeconomic History    Marital status:      Spouse name: Palmira    Number of children: 2    Years of education: Not on file    Highest education level: Not on file   Social Needs    Financial resource strain: Not on file    Food insecurity - worry: Not on file    Food insecurity - inability: Not on file    Transportation needs - medical: Not on file    Transportation needs - non-medical: Not on  file   Occupational History    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     Employer: [employer]   Tobacco Use    Smoking status: Never Smoker    Smokeless tobacco: Never Used   Substance and Sexual Activity    Alcohol use: Yes     Alcohol/week: 1.2 - 1.8 oz     Types: 2 - 3 Glasses of wine per week     Comment: daily    Drug use: No    Sexual activity: Yes     Partners: Female   Other Topics Concern    Not on file   Social History Narrative    Not on file     Past Surgical History:   Procedure Laterality Date    FRACTURE SURGERY      left ankle    HERNIA REPAIR      hydrocele      SKIN LESION EXCISION       Patient Active Problem List   Diagnosis    Anxiety    Elevated PSA     Review of Systems      Objective:      There were no vitals taken for this visit.  Estimated body mass index is 23.02 kg/m² as calculated from the following:    Height as of 11/15/18: 6' (1.829 m).    Weight as of 11/15/18: 77 kg (169 lb 12.1 oz).  Physical Exam      Assessment and Plan:           Problem List Items Addressed This Visit     Elevated PSA - Primary          Follow up:   Long discussion about BPH management. Prostate cancer screening.  6 months with [facility].    Roman Mcdonough

## 2018-12-08 DIAGNOSIS — F41.9 ANXIETY: ICD-10-CM

## 2018-12-10 RX ORDER — CITALOPRAM 20 MG/1
TABLET, FILM COATED ORAL
Qty: 90 TABLET | Refills: 3 | Status: SHIPPED | OUTPATIENT
Start: 2018-12-10 | End: 2019-05-07 | Stop reason: SDUPTHER

## 2019-02-11 LAB
LEFT EYE DM RETINOPATHY: NORMAL
LEFT EYE DM RETINOPATHY: NORMAL
RIGHT EYE DM RETINOPATHY: NORMAL
RIGHT EYE DM RETINOPATHY: NORMAL

## 2019-04-18 ENCOUNTER — TELEPHONE (OUTPATIENT)
Dept: ADMINISTRATIVE | Facility: HOSPITAL | Age: 69
End: 2019-04-18

## 2019-04-25 ENCOUNTER — TELEPHONE (OUTPATIENT)
Dept: ADMINISTRATIVE | Facility: HOSPITAL | Age: 69
End: 2019-04-25

## 2019-05-07 DIAGNOSIS — F41.9 ANXIETY: ICD-10-CM

## 2019-05-07 RX ORDER — CITALOPRAM 20 MG/1
20 TABLET, FILM COATED ORAL DAILY
Qty: 90 TABLET | Refills: 3 | Status: SHIPPED | OUTPATIENT
Start: 2019-05-07 | End: 2019-08-20 | Stop reason: SDUPTHER

## 2019-05-07 NOTE — TELEPHONE ENCOUNTER
Last seen : annual 11-14-18    Request refill : Celexa last refilled  12-10-18  Can this be refilled?

## 2019-05-08 ENCOUNTER — PATIENT MESSAGE (OUTPATIENT)
Dept: INTERNAL MEDICINE | Facility: CLINIC | Age: 69
End: 2019-05-08

## 2019-05-25 ENCOUNTER — PATIENT MESSAGE (OUTPATIENT)
Dept: ADMINISTRATIVE | Facility: HOSPITAL | Age: 69
End: 2019-05-25

## 2019-06-04 ENCOUNTER — LAB VISIT (OUTPATIENT)
Dept: LAB | Facility: HOSPITAL | Age: 69
End: 2019-06-04
Attending: UROLOGY
Payer: MEDICARE

## 2019-06-04 DIAGNOSIS — R97.20 ELEVATED PSA: ICD-10-CM

## 2019-06-04 PROCEDURE — 36415 COLL VENOUS BLD VENIPUNCTURE: CPT | Mod: PO

## 2019-06-05 LAB
-2 PROPSA (PHI): 16.5 PG/ML
FREE PSA (PHI): 1.8 NG/ML
PROSTATE HEALTH INDEX VALUE (PHI): 26.9
PSA FREE MFR SERPL: 21 %
PSA SERPL-MCNC: 8.6 NG/ML

## 2019-06-06 ENCOUNTER — OFFICE VISIT (OUTPATIENT)
Dept: UROLOGY | Facility: CLINIC | Age: 69
End: 2019-06-06
Payer: MEDICARE

## 2019-06-06 VITALS
DIASTOLIC BLOOD PRESSURE: 64 MMHG | WEIGHT: 174 LBS | BODY MASS INDEX: 23.57 KG/M2 | HEART RATE: 61 BPM | HEIGHT: 72 IN | SYSTOLIC BLOOD PRESSURE: 110 MMHG | RESPIRATION RATE: 15 BRPM

## 2019-06-06 DIAGNOSIS — R97.20 ELEVATED PSA: Primary | ICD-10-CM

## 2019-06-06 PROCEDURE — 99214 PR OFFICE/OUTPT VISIT, EST, LEVL IV, 30-39 MIN: ICD-10-PCS | Mod: S$PBB,,, | Performed by: UROLOGY

## 2019-06-06 PROCEDURE — 99999 PR PBB SHADOW E&M-EST. PATIENT-LVL III: ICD-10-PCS | Mod: PBBFAC,,, | Performed by: UROLOGY

## 2019-06-06 PROCEDURE — 99214 OFFICE O/P EST MOD 30 MIN: CPT | Mod: S$PBB,,, | Performed by: UROLOGY

## 2019-06-06 PROCEDURE — 99213 OFFICE O/P EST LOW 20 MIN: CPT | Mod: PBBFAC | Performed by: UROLOGY

## 2019-06-06 PROCEDURE — 99999 PR PBB SHADOW E&M-EST. PATIENT-LVL III: CPT | Mod: PBBFAC,,, | Performed by: UROLOGY

## 2019-06-06 RX ORDER — TAMSULOSIN HYDROCHLORIDE 0.4 MG/1
0.4 CAPSULE ORAL DAILY
Qty: 90 CAPSULE | Refills: 3 | Status: SHIPPED | OUTPATIENT
Start: 2019-06-06 | End: 2019-08-20 | Stop reason: SDUPTHER

## 2019-06-06 NOTE — PROGRESS NOTES
Clinic Note  6/6/2019      Subjective:         Chief Complaint:   HPI  Nilesh Almaraz is a 68 y.o. male with an elevated PSA. Consult from Dr. Aguero.  I did biopsy 2/9/2012- B9 path with acute and chronic inflammation(PSA 3.5).  Negative family history. 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 Has place in [place].  On flomax for BPH since mid November has noted improvement in stream.     NIH risk tool:          Lab Results   Component Value Date    PSA 7.0 (H) 11/08/2018    PSA 4.7 (H) 05/22/2017    PSA 6.8 (H) 03/29/2016    PSA 5.0 (H) 10/16/2013    PSA 3.52 09/27/2012    PSA 3.4 08/04/2011    PSA 4.5 (H) 07/20/2011    PSADIAG 4.3 (H) 10/03/2016    PSATOTAL 3.5 08/04/2011    PSAFREE 0.83 08/04/2011    PSAFREEPCT 23.71 08/04/2011      Past Medical History:   Diagnosis Date    Allergy     Anxiety     BPH (benign prostatic hypertrophy)     Elevated PSA     Prostatism     Skin cancer      Family History   Problem Relation Age of Onset    Cancer Brother         osteo sarcoma    Diabetes Mother     Colon polyps Neg Hx     Heart disease Neg Hx     Amblyopia Neg Hx     Blindness Neg Hx     Cataracts Neg Hx     Glaucoma Neg Hx     Hypertension Neg Hx     Macular degeneration Neg Hx     Retinal detachment Neg Hx     Strabismus Neg Hx     Stroke Neg Hx     Thyroid disease Neg Hx      Social History     Socioeconomic History    Marital status:      Spouse name: Palmira    Number of children: 2    Years of education: Not on file    Highest education level: Not on file   Occupational History    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[employer]     Employer: [employer]   Social Needs    Financial resource strain: Not on file    Food insecurity:     Worry: Not on file     Inability: Not on file    Transportation needs:     Medical: Not on file     Non-medical: Not on file   Tobacco Use    Smoking status: Never Smoker    Smokeless tobacco: Never Used   Substance and Sexual Activity    Alcohol use:  Yes     Alcohol/week: 1.2 - 1.8 oz     Types: 2 - 3 Glasses of wine per week     Comment: daily    Drug use: No    Sexual activity: Yes     Partners: Female   Lifestyle    Physical activity:     Days per week: Not on file     Minutes per session: Not on file    Stress: Not on file   Relationships    Social connections:     Talks on phone: Not on file     Gets together: Not on file     Attends Jainism service: Not on file     Active member of club or organization: Not on file     Attends meetings of clubs or organizations: Not on file     Relationship status: Not on file   Other Topics Concern    Not on file   Social History Narrative    Not on file     Past Surgical History:   Procedure Laterality Date    FRACTURE SURGERY      left ankle    HERNIA REPAIR      hydrocele      SKIN LESION EXCISION       Patient Active Problem List   Diagnosis    Anxiety    Elevated PSA     Review of Systems   Constitutional: Negative for appetite change, chills, fatigue, fever and unexpected weight change.   HENT: Negative for nosebleeds.         Positional vertigo   Respiratory: Negative for shortness of breath and wheezing.    Cardiovascular: Negative for chest pain, palpitations and leg swelling.   Gastrointestinal: Negative for abdominal distention, abdominal pain, constipation, diarrhea, nausea and vomiting.   Genitourinary: Negative for dysuria and hematuria.   Musculoskeletal: Negative for arthralgias and back pain.   Skin: Negative for pallor.   Neurological: Negative for dizziness, seizures and syncope.   Hematological: Negative for adenopathy.   Psychiatric/Behavioral: Negative for dysphoric mood.         Objective:      /64   Pulse 61   Resp 15   Ht 6' (1.829 m)   Wt 78.9 kg (174 lb)   BMI 23.60 kg/m²   Estimated body mass index is 23.6 kg/m² as calculated from the following:    Height as of this encounter: 6' (1.829 m).    Weight as of this encounter: 78.9 kg (174 lb).  Physical Exam       Assessment and Plan:           Problem List Items Addressed This Visit     Elevated PSA - Primary          Follow up:     Discussed PHI results, NIH risk. Discussed surveillance, mp MRI, biopsy. He is comfortable with surveillance as am I.  6 months with PHI.      Roman Mcdonough

## 2019-07-17 ENCOUNTER — TELEPHONE (OUTPATIENT)
Dept: INTERNAL MEDICINE | Facility: CLINIC | Age: 69
End: 2019-07-17

## 2019-08-20 ENCOUNTER — TELEPHONE (OUTPATIENT)
Dept: UROLOGY | Facility: CLINIC | Age: 69
End: 2019-08-20

## 2019-08-20 DIAGNOSIS — F41.9 ANXIETY: ICD-10-CM

## 2019-08-20 RX ORDER — CITALOPRAM 20 MG/1
20 TABLET, FILM COATED ORAL DAILY
Qty: 90 TABLET | Refills: 3 | Status: SHIPPED | OUTPATIENT
Start: 2019-08-20 | End: 2020-06-14 | Stop reason: SDUPTHER

## 2019-08-20 RX ORDER — TAMSULOSIN HYDROCHLORIDE 0.4 MG/1
0.4 CAPSULE ORAL DAILY
Qty: 90 CAPSULE | Refills: 3 | Status: SHIPPED | OUTPATIENT
Start: 2019-08-20 | End: 2020-06-14 | Stop reason: SDUPTHER

## 2019-12-02 ENCOUNTER — LAB VISIT (OUTPATIENT)
Dept: LAB | Facility: HOSPITAL | Age: 69
End: 2019-12-02
Attending: UROLOGY
Payer: MEDICARE

## 2019-12-02 DIAGNOSIS — R97.20 ELEVATED PSA: ICD-10-CM

## 2019-12-02 PROCEDURE — 36415 COLL VENOUS BLD VENIPUNCTURE: CPT | Mod: PO

## 2019-12-03 LAB
-2 PROPSA (PHI): 17.4 PG/ML
FREE PSA (PHI): 1.7 NG/ML
PROSTATE HEALTH INDEX VALUE (PHI): 27.7
PSA FREE MFR SERPL: 23 %
PSA SERPL-MCNC: 7.3 NG/ML

## 2019-12-05 ENCOUNTER — OFFICE VISIT (OUTPATIENT)
Dept: UROLOGY | Facility: CLINIC | Age: 69
End: 2019-12-05
Payer: MEDICARE

## 2019-12-05 VITALS — SYSTOLIC BLOOD PRESSURE: 124 MMHG | DIASTOLIC BLOOD PRESSURE: 63 MMHG | HEART RATE: 57 BPM

## 2019-12-05 DIAGNOSIS — R97.20 ELEVATED PSA: Primary | ICD-10-CM

## 2019-12-05 PROCEDURE — 1126F PR PAIN SEVERITY QUANTIFIED, NO PAIN PRESENT: ICD-10-PCS | Mod: ,,, | Performed by: UROLOGY

## 2019-12-05 PROCEDURE — 99214 PR OFFICE/OUTPT VISIT, EST, LEVL IV, 30-39 MIN: ICD-10-PCS | Mod: S$PBB,,, | Performed by: UROLOGY

## 2019-12-05 PROCEDURE — 99214 OFFICE O/P EST MOD 30 MIN: CPT | Mod: S$PBB,,, | Performed by: UROLOGY

## 2019-12-05 PROCEDURE — 99213 OFFICE O/P EST LOW 20 MIN: CPT | Mod: PBBFAC | Performed by: UROLOGY

## 2019-12-05 PROCEDURE — 1159F PR MEDICATION LIST DOCUMENTED IN MEDICAL RECORD: ICD-10-PCS | Mod: ,,, | Performed by: UROLOGY

## 2019-12-05 PROCEDURE — 1159F MED LIST DOCD IN RCRD: CPT | Mod: ,,, | Performed by: UROLOGY

## 2019-12-05 PROCEDURE — 99999 PR PBB SHADOW E&M-EST. PATIENT-LVL III: CPT | Mod: PBBFAC,,, | Performed by: UROLOGY

## 2019-12-05 PROCEDURE — 99999 PR PBB SHADOW E&M-EST. PATIENT-LVL III: ICD-10-PCS | Mod: PBBFAC,,, | Performed by: UROLOGY

## 2019-12-05 PROCEDURE — 1126F AMNT PAIN NOTED NONE PRSNT: CPT | Mod: ,,, | Performed by: UROLOGY

## 2019-12-05 NOTE — PROGRESS NOTES
Clinic Note  12/5/2019      Subjective:         Chief Complaint:   HPI  Nilesh Almaraz is a 68 y.o. male with an elevated PSA. Consult from Dr. Aguero.  I did biopsy 2/9/2012- B9 path with acute and chronic inflammation(PSA 3.5).  Negative family history. 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  On flomax for BPH since mid November has noted improvement in stream.     PHI score 12/2/2019- PSA 7.3, 23% free, PHI 27.7.           Lab Results   Component Value Date    PSA 7.0 (H) 11/08/2018    PSA 4.7 (H) 05/22/2017    PSA 6.8 (H) 03/29/2016    PSA 5.0 (H) 10/16/2013    PSA 3.52 09/27/2012    PSA 3.4 08/04/2011    PSA 4.5 (H) 07/20/2011    PSADIAG 4.3 (H) 10/03/2016    PSATOTAL 3.5 08/04/2011    PSAFREE 0.83 08/04/2011    PSAFREEPCT 23.71 08/04/2011      Past Medical History:   Diagnosis Date    Allergy     Anxiety     BPH (benign prostatic hypertrophy)     Elevated PSA     Prostatism     Skin cancer      Family History   Problem Relation Age of Onset    Cancer Brother         osteo sarcoma    Diabetes Mother     Colon polyps Neg Hx     Heart disease Neg Hx     Amblyopia Neg Hx     Blindness Neg Hx     Cataracts Neg Hx     Glaucoma Neg Hx     Hypertension Neg Hx     Macular degeneration Neg Hx     Retinal detachment Neg Hx     Strabismus Neg Hx     Stroke Neg Hx     Thyroid disease Neg Hx      Social History     Socioeconomic History    Marital status:      Spouse name: Palmira    Number of children: 2    Years of education: Not on file    Highest education level: Not on file   Occupational History    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[employer]     Employer: [employer]   Social Needs    Financial resource strain: Not on file    Food insecurity:     Worry: Not on file     Inability: Not on file    Transportation needs:     Medical: Not on file     Non-medical: Not on file   Tobacco Use    Smoking status: Never Smoker    Smokeless tobacco: Never Used   Substance  and Sexual Activity    Alcohol use: Yes     Alcohol/week: 2.0 - 3.0 standard drinks     Types: 2 - 3 Glasses of wine per week     Comment: daily    Drug use: No    Sexual activity: Yes     Partners: Female   Lifestyle    Physical activity:     Days per week: Not on file     Minutes per session: Not on file    Stress: Not on file   Relationships    Social connections:     Talks on phone: Not on file     Gets together: Not on file     Attends Scientology service: Not on file     Active member of club or organization: Not on file     Attends meetings of clubs or organizations: Not on file     Relationship status: Not on file   Other Topics Concern    Not on file   Social History Narrative    Not on file     Past Surgical History:   Procedure Laterality Date    FRACTURE SURGERY      left ankle    HERNIA REPAIR      hydrocele      SKIN LESION EXCISION       Patient Active Problem List   Diagnosis    Anxiety    Elevated PSA     Review of Systems   Constitutional: Negative for appetite change, chills, fatigue, fever and unexpected weight change.   HENT: Negative for nosebleeds.    Respiratory: Negative for shortness of breath and wheezing.    Cardiovascular: Negative for chest pain, palpitations and leg swelling.   Gastrointestinal: Negative for abdominal distention, abdominal pain, constipation, diarrhea, nausea and vomiting.   Musculoskeletal: Negative for arthralgias and back pain.   Skin: Negative for pallor.   Neurological: Negative for dizziness, seizures and syncope.   Hematological: Negative for adenopathy.   Psychiatric/Behavioral: Negative for dysphoric mood.         Objective:      There were no vitals taken for this visit.  Estimated body mass index is 23.6 kg/m² as calculated from the following:    Height as of 6/6/19: 6' (1.829 m).    Weight as of 6/6/19: 78.9 kg (174 lb).  Physical Exam   Constitutional: He is oriented to person, place, and time. He appears well-developed and well-nourished. No  distress.   HENT:   Head: Atraumatic.   Neck: No tracheal deviation present.   Cardiovascular: Normal rate.    Pulmonary/Chest: Effort normal. No respiratory distress. He has no wheezes.   Abdominal: Soft. Bowel sounds are normal. He exhibits no distension and no mass. There is no tenderness. There is no rebound and no guarding.   Genitourinary: Rectum normal. Rectal exam shows no external hemorrhoid, no internal hemorrhoid, no fissure, no mass, no tenderness and anal tone normal. Prostate is enlarged. Prostate is not tender.   Neurological: He is alert and oriented to person, place, and time.   Skin: Skin is warm and dry. He is not diaphoretic.     Psychiatric: He has a normal mood and affect. His behavior is normal. Judgment and thought content normal.         Assessment and Plan:           Problem List Items Addressed This Visit     Elevated PSA - Primary          Follow up:   PHI test in one year.    Roman Mcdonough

## 2020-02-06 ENCOUNTER — OFFICE VISIT (OUTPATIENT)
Dept: INTERNAL MEDICINE | Facility: CLINIC | Age: 70
End: 2020-02-06
Payer: MEDICARE

## 2020-02-06 VITALS
SYSTOLIC BLOOD PRESSURE: 112 MMHG | RESPIRATION RATE: 18 BRPM | TEMPERATURE: 99 F | HEART RATE: 60 BPM | HEIGHT: 72 IN | BODY MASS INDEX: 24.42 KG/M2 | DIASTOLIC BLOOD PRESSURE: 74 MMHG | WEIGHT: 180.31 LBS

## 2020-02-06 DIAGNOSIS — K64.9 HEMORRHOIDS, UNSPECIFIED HEMORRHOID TYPE: Primary | ICD-10-CM

## 2020-02-06 DIAGNOSIS — K62.89 PAIN, ANAL: ICD-10-CM

## 2020-02-06 DIAGNOSIS — E78.5 HYPERLIPIDEMIA, UNSPECIFIED HYPERLIPIDEMIA TYPE: ICD-10-CM

## 2020-02-06 DIAGNOSIS — Z00.00 ANNUAL PHYSICAL EXAM: ICD-10-CM

## 2020-02-06 DIAGNOSIS — R97.20 ELEVATED PSA: ICD-10-CM

## 2020-02-06 DIAGNOSIS — F41.9 ANXIETY: ICD-10-CM

## 2020-02-06 PROCEDURE — 99999 PR PBB SHADOW E&M-EST. PATIENT-LVL III: ICD-10-PCS | Mod: PBBFAC,,, | Performed by: INTERNAL MEDICINE

## 2020-02-06 PROCEDURE — 99215 PR OFFICE/OUTPT VISIT, EST, LEVL V, 40-54 MIN: ICD-10-PCS | Mod: S$PBB,,, | Performed by: INTERNAL MEDICINE

## 2020-02-06 PROCEDURE — 99999 PR PBB SHADOW E&M-EST. PATIENT-LVL III: CPT | Mod: PBBFAC,,, | Performed by: INTERNAL MEDICINE

## 2020-02-06 PROCEDURE — 99213 OFFICE O/P EST LOW 20 MIN: CPT | Mod: PBBFAC,PO | Performed by: INTERNAL MEDICINE

## 2020-02-06 PROCEDURE — 99215 OFFICE O/P EST HI 40 MIN: CPT | Mod: S$PBB,,, | Performed by: INTERNAL MEDICINE

## 2020-02-06 NOTE — PROGRESS NOTES
Subjective:       Patient ID: Nilesh Almaraz is a 69 y.o. male.    Chief Complaint: Annual Exam    HPI   69 y.o. Male here for annual exam.      Vaccines: Influenza (2019); Tetanus (declined); Prevnar (2017); Pneumococcal (2018); Shingrix (2020)  Eye exam: 2018  Colonoscopy: 5/17- repeat in 5 years     Exercise: cardio 2-3x/wk  Diet: regular     Past Medical History:    Allergy                                                       Anxiety                                                       Elevated PSA                                                Past Surgical History:    FRACTURE SURGERY                                                 Comment:left ankle  Social History    Marital Status:              Spouse Name: Palmira                Years of Education:                 Number of children: 2              Occupational History  Occupation          Employer            Comment               [employer]                                   Social History Main Topics    Smoking Status: Never Smoker                      Smokeless Status: Never Used                        Alcohol Use: Yes           1.2 - 1.8 oz/week       2-3 Glasses of wine per week    Drug Use: No              Sexual Activity: Yes               Partners with: Female      -- Penicillins     --  Other reaction(s): Hives  Review of Systems   Constitutional: Negative for activity change, appetite change, chills, diaphoresis, fatigue, fever and unexpected weight change.   HENT: Negative for congestion, hearing loss, mouth sores, postnasal drip, rhinorrhea, sinus pressure, sneezing, sore throat, trouble swallowing and voice change.    Eyes: Negative for discharge, itching and visual disturbance.   Respiratory: Negative for cough, chest tightness, shortness of breath and wheezing.    Cardiovascular: Negative for chest pain, palpitations and leg swelling.   Gastrointestinal: Positive for rectal pain. Negative for abdominal distention, abdominal pain,  anal bleeding, blood in stool, constipation, diarrhea, nausea and vomiting.   Endocrine: Negative for cold intolerance, heat intolerance, polydipsia and polyuria.   Genitourinary: Negative for difficulty urinating, dysuria, flank pain, hematuria and urgency.   Musculoskeletal: Positive for arthralgias. Negative for back pain, joint swelling, myalgias and neck pain.   Skin: Negative for rash and wound.   Allergic/Immunologic: Negative for environmental allergies and food allergies.   Neurological: Negative for dizziness, tremors, seizures, syncope, weakness and headaches.   Hematological: Negative for adenopathy. Does not bruise/bleed easily.   Psychiatric/Behavioral: Negative for confusion, dysphoric mood, self-injury, sleep disturbance and suicidal ideas. The patient is nervous/anxious.        Objective:      Physical Exam   Constitutional: He is oriented to person, place, and time. He appears well-developed and well-nourished. No distress.   HENT:   Head: Normocephalic and atraumatic.   Right Ear: External ear normal.   Left Ear: External ear normal.   Nose: Nose normal.   Mouth/Throat: Oropharynx is clear and moist. No oropharyngeal exudate.   Eyes: Pupils are equal, round, and reactive to light. Conjunctivae and EOM are normal. Right eye exhibits no discharge. Left eye exhibits no discharge. No scleral icterus.   Neck: Normal range of motion. Neck supple. No JVD present. No thyromegaly present.   Cardiovascular: Normal rate, regular rhythm, normal heart sounds and intact distal pulses.   No murmur heard.  Pulmonary/Chest: Effort normal and breath sounds normal. No respiratory distress. He has no wheezes. He has no rales.   Abdominal: Soft. Bowel sounds are normal. He exhibits no distension. There is no tenderness. There is no guarding.   Musculoskeletal: He exhibits no edema.   Lymphadenopathy:     He has no cervical adenopathy.   Neurological: He is alert and oriented to person, place, and time. No cranial  nerve deficit.   Skin: Skin is warm and dry. No rash noted. He is not diaphoretic. No pallor.   Psychiatric: He has a normal mood and affect. Judgment normal.       Assessment:       1. Hemorrhoids, unspecified hemorrhoid type    2. Pain, anal    3. Anxiety    4. Elevated PSA    5. Hyperlipidemia, unspecified hyperlipidemia type    6. Annual physical exam        Plan:    1/2. Hemorrhoids/anal pain- referral to Colorectal surgery    3. Anxiety- controlled on Celexa 20 mg daily   4. Elevated PSA- followed by Urology       Hx of negative Bx in 2012   5. HLD- check Lipids   6. Annual exam- labs ordered        Vaccines: Influenza (2019); Tetanus (declined); Prevnar (2017); Pneumococcal (2018); Shingrix (2020)       Eye exam: 2018       Colonoscopy: 5/17- repeat in 5 years   7. F/u in 1 yr     Over 1/2 of 40 minute visit spent reviewing pt's medical records, education/discussion of pt's medical conditions and medical management

## 2020-02-11 ENCOUNTER — LAB VISIT (OUTPATIENT)
Dept: LAB | Facility: HOSPITAL | Age: 70
End: 2020-02-11
Attending: INTERNAL MEDICINE
Payer: MEDICARE

## 2020-02-11 DIAGNOSIS — E78.5 HYPERLIPIDEMIA, UNSPECIFIED HYPERLIPIDEMIA TYPE: ICD-10-CM

## 2020-02-11 DIAGNOSIS — K64.9 HEMORRHOIDS, UNSPECIFIED HEMORRHOID TYPE: ICD-10-CM

## 2020-02-11 DIAGNOSIS — K62.89 PAIN, ANAL: ICD-10-CM

## 2020-02-11 DIAGNOSIS — F41.9 ANXIETY: ICD-10-CM

## 2020-02-11 DIAGNOSIS — R97.20 ELEVATED PSA: ICD-10-CM

## 2020-02-11 LAB
ALBUMIN SERPL BCP-MCNC: 3.3 G/DL (ref 3.5–5.2)
ALP SERPL-CCNC: 72 U/L (ref 55–135)
ALT SERPL W/O P-5'-P-CCNC: 40 U/L (ref 10–44)
ANION GAP SERPL CALC-SCNC: 7 MMOL/L (ref 8–16)
AST SERPL-CCNC: 31 U/L (ref 10–40)
BASOPHILS # BLD AUTO: 0.05 K/UL (ref 0–0.2)
BASOPHILS NFR BLD: 0.6 % (ref 0–1.9)
BILIRUB SERPL-MCNC: 0.7 MG/DL (ref 0.1–1)
BUN SERPL-MCNC: 14 MG/DL (ref 8–23)
CALCIUM SERPL-MCNC: 8.5 MG/DL (ref 8.7–10.5)
CHLORIDE SERPL-SCNC: 107 MMOL/L (ref 95–110)
CHOLEST SERPL-MCNC: 183 MG/DL (ref 120–199)
CHOLEST/HDLC SERPL: 3.7 {RATIO} (ref 2–5)
CO2 SERPL-SCNC: 28 MMOL/L (ref 23–29)
CREAT SERPL-MCNC: 1.1 MG/DL (ref 0.5–1.4)
DIFFERENTIAL METHOD: ABNORMAL
EOSINOPHIL # BLD AUTO: 0.3 K/UL (ref 0–0.5)
EOSINOPHIL NFR BLD: 4 % (ref 0–8)
ERYTHROCYTE [DISTWIDTH] IN BLOOD BY AUTOMATED COUNT: 12.5 % (ref 11.5–14.5)
EST. GFR  (AFRICAN AMERICAN): >60 ML/MIN/1.73 M^2
EST. GFR  (NON AFRICAN AMERICAN): >60 ML/MIN/1.73 M^2
GLUCOSE SERPL-MCNC: 119 MG/DL (ref 70–110)
HCT VFR BLD AUTO: 45.3 % (ref 40–54)
HDLC SERPL-MCNC: 50 MG/DL (ref 40–75)
HDLC SERPL: 27.3 % (ref 20–50)
HGB BLD-MCNC: 14.2 G/DL (ref 14–18)
IMM GRANULOCYTES # BLD AUTO: 0.04 K/UL (ref 0–0.04)
IMM GRANULOCYTES NFR BLD AUTO: 0.5 % (ref 0–0.5)
LDLC SERPL CALC-MCNC: 112.6 MG/DL (ref 63–159)
LYMPHOCYTES # BLD AUTO: 1.5 K/UL (ref 1–4.8)
LYMPHOCYTES NFR BLD: 17.8 % (ref 18–48)
MCH RBC QN AUTO: 30.1 PG (ref 27–31)
MCHC RBC AUTO-ENTMCNC: 31.3 G/DL (ref 32–36)
MCV RBC AUTO: 96 FL (ref 82–98)
MONOCYTES # BLD AUTO: 0.8 K/UL (ref 0.3–1)
MONOCYTES NFR BLD: 9.1 % (ref 4–15)
NEUTROPHILS # BLD AUTO: 5.6 K/UL (ref 1.8–7.7)
NEUTROPHILS NFR BLD: 68 % (ref 38–73)
NONHDLC SERPL-MCNC: 133 MG/DL
NRBC BLD-RTO: 0 /100 WBC
PLATELET # BLD AUTO: 162 K/UL (ref 150–350)
PMV BLD AUTO: 11.2 FL (ref 9.2–12.9)
POTASSIUM SERPL-SCNC: 4.6 MMOL/L (ref 3.5–5.1)
PROT SERPL-MCNC: 6.7 G/DL (ref 6–8.4)
RBC # BLD AUTO: 4.71 M/UL (ref 4.6–6.2)
SODIUM SERPL-SCNC: 142 MMOL/L (ref 136–145)
TRIGL SERPL-MCNC: 102 MG/DL (ref 30–150)
TSH SERPL DL<=0.005 MIU/L-ACNC: 1.18 UIU/ML (ref 0.4–4)
WBC # BLD AUTO: 8.25 K/UL (ref 3.9–12.7)

## 2020-02-11 PROCEDURE — 84443 ASSAY THYROID STIM HORMONE: CPT

## 2020-02-11 PROCEDURE — 36415 COLL VENOUS BLD VENIPUNCTURE: CPT | Mod: PO

## 2020-02-11 PROCEDURE — 80053 COMPREHEN METABOLIC PANEL: CPT

## 2020-02-11 PROCEDURE — 85025 COMPLETE CBC W/AUTO DIFF WBC: CPT

## 2020-02-11 PROCEDURE — 80061 LIPID PANEL: CPT

## 2020-02-12 ENCOUNTER — TELEPHONE (OUTPATIENT)
Dept: INTERNAL MEDICINE | Facility: CLINIC | Age: 70
End: 2020-02-12

## 2020-02-12 DIAGNOSIS — R73.9 ELEVATED BLOOD SUGAR: Primary | ICD-10-CM

## 2020-02-14 ENCOUNTER — PATIENT MESSAGE (OUTPATIENT)
Dept: INTERNAL MEDICINE | Facility: CLINIC | Age: 70
End: 2020-02-14

## 2020-02-14 ENCOUNTER — TELEPHONE (OUTPATIENT)
Dept: INTERNAL MEDICINE | Facility: CLINIC | Age: 70
End: 2020-02-14

## 2020-02-14 ENCOUNTER — LAB VISIT (OUTPATIENT)
Dept: LAB | Facility: HOSPITAL | Age: 70
End: 2020-02-14
Attending: INTERNAL MEDICINE
Payer: MEDICARE

## 2020-02-14 DIAGNOSIS — R73.9 ELEVATED BLOOD SUGAR: ICD-10-CM

## 2020-02-14 DIAGNOSIS — R21 GROIN RASH: Primary | ICD-10-CM

## 2020-02-14 LAB
ESTIMATED AVG GLUCOSE: 117 MG/DL (ref 68–131)
HBA1C MFR BLD HPLC: 5.7 % (ref 4–5.6)

## 2020-02-14 PROCEDURE — 83036 HEMOGLOBIN GLYCOSYLATED A1C: CPT

## 2020-02-14 PROCEDURE — 36415 COLL VENOUS BLD VENIPUNCTURE: CPT | Mod: PO

## 2020-02-14 NOTE — TELEPHONE ENCOUNTER
Referral to Derm  I would still recommend you f/u with a colon specialist b/c of the rectal bleeding just to be on the safe side

## 2020-05-29 ENCOUNTER — PATIENT MESSAGE (OUTPATIENT)
Dept: INTERNAL MEDICINE | Facility: CLINIC | Age: 70
End: 2020-05-29

## 2020-06-14 DIAGNOSIS — F41.9 ANXIETY: ICD-10-CM

## 2020-06-15 RX ORDER — CITALOPRAM 20 MG/1
20 TABLET, FILM COATED ORAL DAILY
Qty: 90 TABLET | Refills: 3 | Status: SHIPPED | OUTPATIENT
Start: 2020-06-15 | End: 2020-09-28

## 2020-07-14 ENCOUNTER — OFFICE VISIT (OUTPATIENT)
Dept: DERMATOLOGY | Facility: CLINIC | Age: 70
End: 2020-07-14
Payer: MEDICARE

## 2020-07-14 VITALS — BODY MASS INDEX: 24.41 KG/M2 | WEIGHT: 180 LBS

## 2020-07-14 DIAGNOSIS — L81.4 LENTIGINES: ICD-10-CM

## 2020-07-14 DIAGNOSIS — L82.1 SEBORRHEIC KERATOSES: Primary | ICD-10-CM

## 2020-07-14 DIAGNOSIS — D18.01 CHERRY ANGIOMA: ICD-10-CM

## 2020-07-14 DIAGNOSIS — R21 GROIN RASH: ICD-10-CM

## 2020-07-14 DIAGNOSIS — Z12.83 SKIN EXAM, SCREENING FOR CANCER: ICD-10-CM

## 2020-07-14 PROCEDURE — 99999 PR PBB SHADOW E&M-EST. PATIENT-LVL III: CPT | Mod: PBBFAC,,, | Performed by: DERMATOLOGY

## 2020-07-14 PROCEDURE — 99203 OFFICE O/P NEW LOW 30 MIN: CPT | Mod: S$PBB,,, | Performed by: DERMATOLOGY

## 2020-07-14 PROCEDURE — 99999 PR PBB SHADOW E&M-EST. PATIENT-LVL III: ICD-10-PCS | Mod: PBBFAC,,, | Performed by: DERMATOLOGY

## 2020-07-14 PROCEDURE — 99213 OFFICE O/P EST LOW 20 MIN: CPT | Mod: PBBFAC,PO | Performed by: DERMATOLOGY

## 2020-07-14 PROCEDURE — 99203 PR OFFICE/OUTPT VISIT, NEW, LEVL III, 30-44 MIN: ICD-10-PCS | Mod: S$PBB,,, | Performed by: DERMATOLOGY

## 2020-07-14 NOTE — LETTER
July 14, 2020      Ke Aguero,   [address] [facility]  [place] [address]           [place] [facility]  [address] [facility]  [place] [address]  Phone: [phone]  Fax: [phone]          Patient: Nilesh Almaraz   MR Number: [id number]   YOB: 1950   Date of Visit: 7/14/2020       Dear Dr. Ke Aguero:    Thank you for referring Nilesh Almaraz to me for evaluation. Attached you will find relevant portions of my assessment and plan of care.    If you have questions, please do not hesitate to call me. I look forward to following Nilesh Almaraz along with you.    Sincerely,    Cecile Chavez MD    Enclosure  CC:  No Recipients    If you would like to receive this communication electronically, please contact [email or web address] or [phone] to request more information on [employer] Link access.    For providers and/or their staff who would like to refer a patient to [facility], please contact us through our one-stop-shop provider referral line, [facility], at [phone].    If you feel you have received this communication in error or would no longer like to receive these types of communications, please e-mail [email or web address]

## 2020-07-14 NOTE — PROGRESS NOTES
Subjective:       Patient ID:  Nilesh Almaraz is a 69 y.o. male who presents for   Chief Complaint   Patient presents with    Mole    Skin Check     TBSE     Would like skin check no lesions of concern.     Mole - Initial  Affected locations: face, left arm, right arm, chest, torso, back, abdomen, right upper leg, left lower leg and right lower leg        Review of Systems   Constitutional: Negative for fever, chills, weight loss, weight gain, fatigue, night sweats and malaise.   Skin: Positive for activity-related sunscreen use. Negative for daily sunscreen use and wears hat.   Hematologic/Lymphatic: Does not bruise/bleed easily.        Objective:    Physical Exam   Constitutional: He appears well-developed and well-nourished. No distress.   Neurological: He is alert and oriented to person, place, and time. He is not disoriented.   Psychiatric: He has a normal mood and affect.   Skin:   Areas Examined (abnormalities noted in diagram):   Scalp / Hair Palpated and Inspected  Head / Face Inspection Performed  Neck Inspection Performed  Chest / Axilla Inspection Performed  Abdomen Inspection Performed  Genitals / Buttocks / Groin Inspection Performed  Back Inspection Performed  RUE Inspected  LUE Inspection Performed  RLE Inspected  LLE Inspection Performed  Nails and Digits Inspection Performed                   Diagram Legend     Erythematous scaling macule/papule c/w actinic keratosis       Vascular papule c/w angioma      Pigmented verrucoid papule/plaque c/w seborrheic keratosis      Yellow umbilicated papule c/w sebaceous hyperplasia      Irregularly shaped tan macule c/w lentigo     1-2 mm smooth white papules consistent with Milia      Movable subcutaneous cyst with punctum c/w epidermal inclusion cyst      Subcutaneous movable cyst c/w pilar cyst      Firm pink to brown papule c/w dermatofibroma      Pedunculated fleshy papule(s) c/w skin tag(s)      Evenly pigmented macule c/w junctional nevus      "Mildly variegated pigmented, slightly irregular-bordered macule c/w mildly atypical nevus      Flesh colored to evenly pigmented papule c/w intradermal nevus       Pink pearly papule/plaque c/w basal cell carcinoma      Erythematous hyperkeratotic cursted plaque c/w SCC      Surgical scar with no sign of skin cancer recurrence      Open and closed comedones      Inflammatory papules and pustules      Verrucoid papule consistent consistent with wart     Erythematous eczematous patches and plaques     Dystrophic onycholytic nail with subungual debris c/w onychomycosis     Umbilicated papule    Erythematous-base heme-crusted tan verrucoid plaque consistent with inflamed seborrheic keratosis     Erythematous Silvery Scaling Plaque c/w Psoriasis     See annotation      Assessment / Plan:        Groin rash  -     Ambulatory referral/consult to Dermatology  resolved  1. Seborrheic keratoses   reassurance  Brochure provided      2. Groin rash  Ambulatory referral/consult to Dermatology   3. Lentigines   The "ABCD" rules to observe pigmented lesions were reviewed.      4. Cherry angioma   reassurance      5. Skin exam, screening for cancer     Total body skin examination performed today including at least 12 points as noted in physical examination. No lesions suspicious for malignancy noted.                   Follow up in about 1 year (around 7/14/2021).  "

## 2020-07-15 DIAGNOSIS — Z71.89 COMPLEX CARE COORDINATION: ICD-10-CM

## 2020-09-25 LAB
LEFT EYE DM RETINOPATHY: NORMAL
RIGHT EYE DM RETINOPATHY: NORMAL

## 2020-10-07 ENCOUNTER — PATIENT OUTREACH (OUTPATIENT)
Dept: ADMINISTRATIVE | Facility: HOSPITAL | Age: 70
End: 2020-10-07

## 2020-10-21 ENCOUNTER — PATIENT OUTREACH (OUTPATIENT)
Dept: ADMINISTRATIVE | Facility: HOSPITAL | Age: 70
End: 2020-10-21

## 2021-01-12 ENCOUNTER — OFFICE VISIT (OUTPATIENT)
Dept: SPORTS MEDICINE | Facility: CLINIC | Age: 71
End: 2021-01-12
Payer: MEDICARE

## 2021-01-12 ENCOUNTER — HOSPITAL ENCOUNTER (OUTPATIENT)
Dept: RADIOLOGY | Facility: HOSPITAL | Age: 71
Discharge: HOME | End: 2021-01-12
Attending: FAMILY MEDICINE
Payer: MEDICARE

## 2021-01-12 VITALS — BODY MASS INDEX: 24.38 KG/M2 | HEIGHT: 72 IN | TEMPERATURE: 97 F | WEIGHT: 180 LBS

## 2021-01-12 DIAGNOSIS — M25.562 PAIN IN BOTH KNEES, UNSPECIFIED CHRONICITY: ICD-10-CM

## 2021-01-12 DIAGNOSIS — M25.562 PAIN IN BOTH KNEES, UNSPECIFIED CHRONICITY: Primary | ICD-10-CM

## 2021-01-12 DIAGNOSIS — M25.561 PAIN IN BOTH KNEES, UNSPECIFIED CHRONICITY: ICD-10-CM

## 2021-01-12 DIAGNOSIS — M22.42 CHONDROMALACIA OF LEFT PATELLA: ICD-10-CM

## 2021-01-12 DIAGNOSIS — M25.561 PAIN IN BOTH KNEES, UNSPECIFIED CHRONICITY: Primary | ICD-10-CM

## 2021-01-12 DIAGNOSIS — M17.12 PRIMARY OSTEOARTHRITIS OF LEFT KNEE: ICD-10-CM

## 2021-01-12 PROCEDURE — 73564 X-RAY EXAM KNEE 4 OR MORE: CPT | Mod: 26,50,, | Performed by: RADIOLOGY

## 2021-01-12 PROCEDURE — 99204 PR OFFICE/OUTPT VISIT, NEW, LEVL IV, 45-59 MIN: ICD-10-PCS | Mod: S$PBB,,, | Performed by: FAMILY MEDICINE

## 2021-01-12 PROCEDURE — 99204 OFFICE O/P NEW MOD 45 MIN: CPT | Mod: S$PBB,,, | Performed by: FAMILY MEDICINE

## 2021-01-12 PROCEDURE — 73564 X-RAY EXAM KNEE 4 OR MORE: CPT | Mod: TC,50

## 2021-01-12 PROCEDURE — 99213 OFFICE O/P EST LOW 20 MIN: CPT | Mod: PBBFAC,25 | Performed by: FAMILY MEDICINE

## 2021-01-12 PROCEDURE — 73564 XR KNEE ORTHO BILAT WITH FLEXION: ICD-10-PCS | Mod: 26,50,, | Performed by: RADIOLOGY

## 2021-01-12 PROCEDURE — 99999 PR PBB SHADOW E&M-EST. PATIENT-LVL III: CPT | Mod: PBBFAC,,, | Performed by: FAMILY MEDICINE

## 2021-01-12 PROCEDURE — 99999 PR PBB SHADOW E&M-EST. PATIENT-LVL III: ICD-10-PCS | Mod: PBBFAC,,, | Performed by: FAMILY MEDICINE

## 2021-04-05 ENCOUNTER — PATIENT MESSAGE (OUTPATIENT)
Dept: ADMINISTRATIVE | Facility: HOSPITAL | Age: 71
End: 2021-04-05

## 2021-06-15 ENCOUNTER — TELEPHONE (OUTPATIENT)
Dept: INTERNAL MEDICINE | Facility: CLINIC | Age: 71
End: 2021-06-15

## 2021-06-15 DIAGNOSIS — E78.5 HYPERLIPIDEMIA, UNSPECIFIED HYPERLIPIDEMIA TYPE: ICD-10-CM

## 2021-06-15 DIAGNOSIS — R97.20 ELEVATED PSA: ICD-10-CM

## 2021-06-15 DIAGNOSIS — R73.9 ELEVATED BLOOD SUGAR: Primary | ICD-10-CM

## 2021-06-16 ENCOUNTER — PATIENT MESSAGE (OUTPATIENT)
Dept: INTERNAL MEDICINE | Facility: CLINIC | Age: 71
End: 2021-06-16

## 2021-10-04 ENCOUNTER — PATIENT MESSAGE (OUTPATIENT)
Dept: ADMINISTRATIVE | Facility: HOSPITAL | Age: 71
End: 2021-10-04

## 2021-10-23 DIAGNOSIS — F41.9 ANXIETY: ICD-10-CM

## 2021-10-25 RX ORDER — CITALOPRAM 20 MG/1
TABLET, FILM COATED ORAL
Qty: 90 TABLET | Refills: 1 | Status: SHIPPED | OUTPATIENT
Start: 2021-10-25 | End: 2021-11-15

## 2021-11-07 DIAGNOSIS — R53.83 FATIGUE, UNSPECIFIED TYPE: ICD-10-CM

## 2021-11-07 DIAGNOSIS — E78.5 HYPERLIPIDEMIA, UNSPECIFIED HYPERLIPIDEMIA TYPE: ICD-10-CM

## 2021-11-07 DIAGNOSIS — R35.1 NOCTURIA: ICD-10-CM

## 2021-11-07 DIAGNOSIS — Z00.00 PREVENTATIVE HEALTH CARE: Primary | ICD-10-CM

## 2021-11-08 ENCOUNTER — OFFICE VISIT (OUTPATIENT)
Dept: DERMATOLOGY | Facility: CLINIC | Age: 71
End: 2021-11-08
Payer: MEDICARE

## 2021-11-08 VITALS — BODY MASS INDEX: 24.41 KG/M2 | WEIGHT: 180 LBS

## 2021-11-08 DIAGNOSIS — D18.01 CHERRY ANGIOMA: ICD-10-CM

## 2021-11-08 DIAGNOSIS — L81.4 LENTIGINES: ICD-10-CM

## 2021-11-08 DIAGNOSIS — L82.1 SEBORRHEIC KERATOSES: Primary | ICD-10-CM

## 2021-11-08 DIAGNOSIS — D22.9 NEVUS OF MULTIPLE SITES: ICD-10-CM

## 2021-11-08 DIAGNOSIS — Z12.83 SKIN EXAM, SCREENING FOR CANCER: ICD-10-CM

## 2021-11-08 PROCEDURE — 17110 PR DESTRUCTION BENIGN LESIONS UP TO 14: ICD-10-PCS | Mod: S$PBB,,, | Performed by: DERMATOLOGY

## 2021-11-08 PROCEDURE — 17110 DESTRUCTION B9 LES UP TO 14: CPT | Mod: PBBFAC | Performed by: DERMATOLOGY

## 2021-11-08 PROCEDURE — 99999 PR PBB SHADOW E&M-EST. PATIENT-LVL II: CPT | Mod: PBBFAC,,, | Performed by: DERMATOLOGY

## 2021-11-08 PROCEDURE — 99999 PR PBB SHADOW E&M-EST. PATIENT-LVL II: ICD-10-PCS | Mod: PBBFAC,,, | Performed by: DERMATOLOGY

## 2021-11-08 PROCEDURE — 99212 OFFICE O/P EST SF 10 MIN: CPT | Mod: PBBFAC,PO | Performed by: DERMATOLOGY

## 2021-11-08 PROCEDURE — 17110 DESTRUCTION B9 LES UP TO 14: CPT | Mod: S$PBB,,, | Performed by: DERMATOLOGY

## 2021-11-08 PROCEDURE — 99213 OFFICE O/P EST LOW 20 MIN: CPT | Mod: S$PBB,25,, | Performed by: DERMATOLOGY

## 2021-11-08 PROCEDURE — 99213 PR OFFICE/OUTPT VISIT, EST, LEVL III, 20-29 MIN: ICD-10-PCS | Mod: S$PBB,25,, | Performed by: DERMATOLOGY

## 2021-11-29 LAB
LEFT EYE DM RETINOPATHY: NORMAL
RIGHT EYE DM RETINOPATHY: NORMAL

## 2021-12-03 ENCOUNTER — PATIENT OUTREACH (OUTPATIENT)
Dept: ADMINISTRATIVE | Facility: HOSPITAL | Age: 71
End: 2021-12-03
Payer: MEDICARE

## 2022-01-12 ENCOUNTER — LAB VISIT (OUTPATIENT)
Dept: LAB | Facility: HOSPITAL | Age: 72
End: 2022-01-12
Attending: INTERNAL MEDICINE
Payer: MEDICARE

## 2022-01-12 DIAGNOSIS — E78.5 HYPERLIPIDEMIA, UNSPECIFIED HYPERLIPIDEMIA TYPE: ICD-10-CM

## 2022-01-12 DIAGNOSIS — R73.9 ELEVATED BLOOD SUGAR: ICD-10-CM

## 2022-01-12 DIAGNOSIS — R97.20 ELEVATED PSA: ICD-10-CM

## 2022-01-12 LAB
ALBUMIN SERPL BCP-MCNC: 3.6 G/DL (ref 3.5–5.2)
ALP SERPL-CCNC: 63 U/L (ref 55–135)
ALT SERPL W/O P-5'-P-CCNC: 33 U/L (ref 10–44)
ANION GAP SERPL CALC-SCNC: 9 MMOL/L (ref 8–16)
AST SERPL-CCNC: 25 U/L (ref 10–40)
BASOPHILS # BLD AUTO: 0.04 K/UL (ref 0–0.2)
BASOPHILS NFR BLD: 0.8 % (ref 0–1.9)
BILIRUB SERPL-MCNC: 0.9 MG/DL (ref 0.1–1)
BUN SERPL-MCNC: 14 MG/DL (ref 8–23)
CALCIUM SERPL-MCNC: 9.1 MG/DL (ref 8.7–10.5)
CHLORIDE SERPL-SCNC: 105 MMOL/L (ref 95–110)
CHOLEST SERPL-MCNC: 206 MG/DL (ref 120–199)
CHOLEST/HDLC SERPL: 4 {RATIO} (ref 2–5)
CO2 SERPL-SCNC: 25 MMOL/L (ref 23–29)
COMPLEXED PSA SERPL-MCNC: 8.4 NG/ML (ref 0–4)
CREAT SERPL-MCNC: 0.9 MG/DL (ref 0.5–1.4)
DIFFERENTIAL METHOD: ABNORMAL
EOSINOPHIL # BLD AUTO: 0.2 K/UL (ref 0–0.5)
EOSINOPHIL NFR BLD: 3.1 % (ref 0–8)
ERYTHROCYTE [DISTWIDTH] IN BLOOD BY AUTOMATED COUNT: 12.2 % (ref 11.5–14.5)
EST. GFR  (AFRICAN AMERICAN): >60 ML/MIN/1.73 M^2
EST. GFR  (NON AFRICAN AMERICAN): >60 ML/MIN/1.73 M^2
ESTIMATED AVG GLUCOSE: 117 MG/DL (ref 68–131)
GLUCOSE SERPL-MCNC: 113 MG/DL (ref 70–110)
HBA1C MFR BLD: 5.7 % (ref 4–5.6)
HCT VFR BLD AUTO: 45.8 % (ref 40–54)
HDLC SERPL-MCNC: 52 MG/DL (ref 40–75)
HDLC SERPL: 25.2 % (ref 20–50)
HGB BLD-MCNC: 14.5 G/DL (ref 14–18)
IMM GRANULOCYTES # BLD AUTO: 0.01 K/UL (ref 0–0.04)
IMM GRANULOCYTES NFR BLD AUTO: 0.2 % (ref 0–0.5)
LDLC SERPL CALC-MCNC: 129.6 MG/DL (ref 63–159)
LYMPHOCYTES # BLD AUTO: 1.4 K/UL (ref 1–4.8)
LYMPHOCYTES NFR BLD: 28.6 % (ref 18–48)
MCH RBC QN AUTO: 30.1 PG (ref 27–31)
MCHC RBC AUTO-ENTMCNC: 31.7 G/DL (ref 32–36)
MCV RBC AUTO: 95 FL (ref 82–98)
MONOCYTES # BLD AUTO: 0.5 K/UL (ref 0.3–1)
MONOCYTES NFR BLD: 9.5 % (ref 4–15)
NEUTROPHILS # BLD AUTO: 2.8 K/UL (ref 1.8–7.7)
NEUTROPHILS NFR BLD: 57.8 % (ref 38–73)
NONHDLC SERPL-MCNC: 154 MG/DL
NRBC BLD-RTO: 0 /100 WBC
PLATELET # BLD AUTO: 161 K/UL (ref 150–450)
PMV BLD AUTO: 10.8 FL (ref 9.2–12.9)
POTASSIUM SERPL-SCNC: 4.2 MMOL/L (ref 3.5–5.1)
PROT SERPL-MCNC: 6.8 G/DL (ref 6–8.4)
RBC # BLD AUTO: 4.81 M/UL (ref 4.6–6.2)
SODIUM SERPL-SCNC: 139 MMOL/L (ref 136–145)
TRIGL SERPL-MCNC: 122 MG/DL (ref 30–150)
TSH SERPL DL<=0.005 MIU/L-ACNC: 1.83 UIU/ML (ref 0.4–4)
WBC # BLD AUTO: 4.86 K/UL (ref 3.9–12.7)

## 2022-01-12 PROCEDURE — 84443 ASSAY THYROID STIM HORMONE: CPT | Performed by: INTERNAL MEDICINE

## 2022-01-12 PROCEDURE — 36415 COLL VENOUS BLD VENIPUNCTURE: CPT | Mod: PO | Performed by: INTERNAL MEDICINE

## 2022-01-12 PROCEDURE — 83036 HEMOGLOBIN GLYCOSYLATED A1C: CPT | Performed by: INTERNAL MEDICINE

## 2022-01-12 PROCEDURE — 84153 ASSAY OF PSA TOTAL: CPT | Performed by: INTERNAL MEDICINE

## 2022-01-12 PROCEDURE — 85025 COMPLETE CBC W/AUTO DIFF WBC: CPT | Performed by: INTERNAL MEDICINE

## 2022-01-12 PROCEDURE — 80053 COMPREHEN METABOLIC PANEL: CPT | Performed by: INTERNAL MEDICINE

## 2022-01-12 PROCEDURE — 80061 LIPID PANEL: CPT | Performed by: INTERNAL MEDICINE

## 2022-01-19 ENCOUNTER — OFFICE VISIT (OUTPATIENT)
Dept: INTERNAL MEDICINE | Facility: CLINIC | Age: 72
End: 2022-01-19
Payer: MEDICARE

## 2022-01-19 VITALS
SYSTOLIC BLOOD PRESSURE: 104 MMHG | BODY MASS INDEX: 24.3 KG/M2 | WEIGHT: 179.44 LBS | HEART RATE: 51 BPM | DIASTOLIC BLOOD PRESSURE: 60 MMHG | OXYGEN SATURATION: 92 % | TEMPERATURE: 98 F | HEIGHT: 72 IN

## 2022-01-19 DIAGNOSIS — G89.29 CHRONIC THUMB PAIN, LEFT: ICD-10-CM

## 2022-01-19 DIAGNOSIS — R97.20 ELEVATED PSA: ICD-10-CM

## 2022-01-19 DIAGNOSIS — M79.645 CHRONIC THUMB PAIN, LEFT: ICD-10-CM

## 2022-01-19 DIAGNOSIS — F41.9 ANXIETY: Primary | ICD-10-CM

## 2022-01-19 DIAGNOSIS — K64.9 HEMORRHOIDS, UNSPECIFIED HEMORRHOID TYPE: ICD-10-CM

## 2022-01-19 PROCEDURE — 99215 PR OFFICE/OUTPT VISIT, EST, LEVL V, 40-54 MIN: ICD-10-PCS | Mod: S$PBB,,, | Performed by: INTERNAL MEDICINE

## 2022-01-19 PROCEDURE — 99999 PR PBB SHADOW E&M-EST. PATIENT-LVL III: ICD-10-PCS | Mod: PBBFAC,,, | Performed by: INTERNAL MEDICINE

## 2022-01-19 PROCEDURE — 99213 OFFICE O/P EST LOW 20 MIN: CPT | Mod: PBBFAC,PO | Performed by: INTERNAL MEDICINE

## 2022-01-19 PROCEDURE — 99215 OFFICE O/P EST HI 40 MIN: CPT | Mod: S$PBB,,, | Performed by: INTERNAL MEDICINE

## 2022-01-19 PROCEDURE — 99999 PR PBB SHADOW E&M-EST. PATIENT-LVL III: CPT | Mod: PBBFAC,,, | Performed by: INTERNAL MEDICINE

## 2022-01-19 NOTE — PROGRESS NOTES
Subjective:       Patient ID: Nilesh Almaraz is a 71 y.o. male.    Chief Complaint: Annual Exam (Labs 1/12/22) and Wrist Pain (Lt wrist Pt says Constant pain.)    HPI   71 y.o. Male here for annual exam.      Vaccines: Influenza (2021); Tetanus (2017); Prevnar (2017); Pneumococcal (2018); Shingrix (2020)  Eye exam: 2018  Colonoscopy: 5/17- repeat in 5 years     Exercise: cardio 2-3x/wk  Diet: regular     Past Medical History:    Allergy                                                       Anxiety                                                       Elevated PSA                                                Past Surgical History:    FRACTURE SURGERY                                                 Comment:left ankle  Social History    Marital Status:              Spouse Name: Palmira                Years of Education:                 Number of children: 2              Occupational History  Occupation          Employer            Comment               [employer]                                   Social History Main Topics    Smoking Status: Never Smoker                      Smokeless Status: Never Used                        Alcohol Use: Yes           1.2 - 1.8 oz/week       2-3 Glasses of wine per week    Drug Use: No              Sexual Activity: Yes               Partners with: Female      -- Penicillins     --  Other reaction(s): Hives  Review of Systems   Constitutional: Negative for activity change, appetite change, chills, diaphoresis, fatigue, fever and unexpected weight change.   HENT: Negative for nasal congestion, hearing loss, mouth sores, postnasal drip, rhinorrhea, sinus pressure/congestion, sneezing, sore throat, trouble swallowing and voice change.    Eyes: Negative for discharge, itching and visual disturbance.   Respiratory: Negative for cough, chest tightness, shortness of breath and wheezing.    Cardiovascular: Negative for chest pain, palpitations and leg swelling.   Gastrointestinal:  Negative for abdominal pain, blood in stool, constipation, diarrhea, nausea and vomiting.   Endocrine: Negative for cold intolerance, heat intolerance, polydipsia and polyuria.   Genitourinary: Negative for difficulty urinating, dysuria, flank pain, hematuria and urgency.   Musculoskeletal: Positive for arthralgias. Negative for back pain, myalgias and neck pain.   Integumentary:  Negative for rash and wound.   Allergic/Immunologic: Negative for environmental allergies and food allergies.   Neurological: Negative for dizziness, tremors, seizures, syncope, weakness and headaches.   Hematological: Negative for adenopathy. Does not bruise/bleed easily.   Psychiatric/Behavioral: Negative for confusion, dysphoric mood, self-injury, sleep disturbance and suicidal ideas. The patient is nervous/anxious.          Objective:      Physical Exam  Constitutional:       General: He is not in acute distress.     Appearance: He is well-developed and well-nourished. He is not diaphoretic.   HENT:      Head: Normocephalic and atraumatic.      Right Ear: External ear normal.      Left Ear: External ear normal.      Nose: Nose normal.      Mouth/Throat:      Mouth: Oropharynx is clear and moist.      Pharynx: No oropharyngeal exudate.   Eyes:      General: No scleral icterus.        Right eye: No discharge.         Left eye: No discharge.      Extraocular Movements: EOM normal.      Conjunctiva/sclera: Conjunctivae normal.      Pupils: Pupils are equal, round, and reactive to light.   Neck:      Thyroid: No thyromegaly.      Vascular: No JVD.   Cardiovascular:      Rate and Rhythm: Normal rate and regular rhythm.      Pulses: Intact distal pulses.      Heart sounds: Normal heart sounds. No murmur heard.      Pulmonary:      Effort: Pulmonary effort is normal. No respiratory distress.      Breath sounds: Normal breath sounds. No wheezing or rales.   Abdominal:      General: Bowel sounds are normal. There is no distension.       Palpations: Abdomen is soft.      Tenderness: There is no abdominal tenderness. There is no guarding.   Musculoskeletal:         General: No edema.      Cervical back: Normal range of motion and neck supple.   Lymphadenopathy:      Cervical: No cervical adenopathy.   Skin:     General: Skin is warm and dry.      Coloration: Skin is not pale.      Findings: No rash.   Neurological:      Mental Status: He is alert and oriented to person, place, and time.   Psychiatric:         Mood and Affect: Mood and affect normal.         Judgment: Judgment normal.         Assessment:       Problem List Items Addressed This Visit        Psychiatric    Anxiety - Primary       Renal/    Elevated PSA       GI    Hemorrhoids      Other Visit Diagnoses     Chronic thumb pain, left        Relevant Orders    Ambulatory referral/consult to Orthopedics          Plan:    Hemorrhoids- stable      Anxiety- controlled on Celexa 20 mg daily      Elevated PSA- followed by Urology       Hx of negative Bx in 2012     Thumb pain- referral to Ortho     F/u in 1 yr    Over 1/2 of 40 minute visit spent reviewing pt's medical records, education/discussion of pt's medical conditions and medical management

## 2022-02-02 NOTE — PROGRESS NOTES
Clinic Note  2/2/2022      Subjective:         Chief Complaint:   HPI  Nilesh Almaraz is a 71 y.o. male with an elevated PSA. Consult from Dr. Aguero.  I did biopsy 2/9/2012- B9 path with acute and chronic inflammation(PSA 3.5).  Negative family history. 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 Has place in [place] since [place].  On flomax for BPH. Last prostate MRI 2018.     PHI score 12/2/2019- PSA 7.3, 23% free, PHI 27.7.          Lab Results   Component Value Date    PSA 7.0 (H) 11/08/2018    PSA 4.7 (H) 05/22/2017    PSA 6.8 (H) 03/29/2016    PSA 5.0 (H) 10/16/2013    PSA 3.52 09/27/2012    PSA 3.4 08/04/2011    PSA 4.5 (H) 07/20/2011    PSADIAG 8.4 (H) 01/12/2022    PSADIAG 4.3 (H) 10/03/2016    PSATOTAL 3.5 08/04/2011    PSAFREE 0.83 08/04/2011    PSAFREEPCT 23.71 08/04/2011      Past Medical History:   Diagnosis Date    Allergy     Anxiety     BPH (benign prostatic hypertrophy)     Elevated PSA     Prostatism     Skin cancer      Family History   Problem Relation Age of Onset    Cancer Brother         osteo sarcoma    Diabetes Mother     Colon polyps Neg Hx     Heart disease Neg Hx     Amblyopia Neg Hx     Blindness Neg Hx     Cataracts Neg Hx     Glaucoma Neg Hx     Hypertension Neg Hx     Macular degeneration Neg Hx     Retinal detachment Neg Hx     Strabismus Neg Hx     Stroke Neg Hx     Thyroid disease Neg Hx      Social History     Socioeconomic History    Marital status:      Spouse name: Palmira    Number of children: 2   Occupational History    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iest     Employer: [employer]   Tobacco Use    Smoking status: Never Smoker    Smokeless tobacco: Never Used   Substance and Sexual Activity    Alcohol use: Yes     Alcohol/week: 2.0 - 3.0 standard drinks     Types: 2 - 3 Glasses of wine per week     Comment: daily    Drug use: No    Sexual activity: Yes     Partners: Female     Social Determinants of Health     Financial Resource  Strain: Low Risk     Difficulty of Paying Living Expenses: Not hard at all   Food Insecurity: No Food Insecurity    Worried About Running Out of Food in the Last Year: Never true    Ran Out of Food in the Last Year: Never true   Transportation Needs: No Transportation Needs    Lack of Transportation (Medical): No    Lack of Transportation (Non-Medical): No   Physical Activity: Sufficiently Active    Days of Exercise per Week: 5 days    Minutes of Exercise per Session: 30 min   Stress: No Stress Concern Present    Feeling of Stress : Not at all   Social Connections: Unknown    Frequency of Communication with Friends and Family: Three times a week    Frequency of Social Gatherings with Friends and Family: Once a week    Active Member of Clubs or Organizations: Yes    Attends Club or Organization Meetings: More than 4 times per year    Marital Status:    Housing Stability: Low Risk     Unable to Pay for Housing in the Last Year: No    Number of Places Lived in the Last Year: 2    Unstable Housing in the Last Year: No     Past Surgical History:   Procedure Laterality Date    FRACTURE SURGERY      left ankle    HERNIA REPAIR      hydrocele      SKIN LESION EXCISION       Patient Active Problem List   Diagnosis    Anxiety    Elevated PSA    Hemorrhoids     Review of Systems      Objective:      There were no vitals taken for this visit.  Estimated body mass index is 24.34 kg/m² as calculated from the following:    Height as of 1/19/22: 6' (1.829 m).    Weight as of 1/19/22: 81.4 kg (179 lb 7.3 oz).  Physical Exam  Constitutional:       General: He is not in acute distress.     Appearance: He is well-developed and well-nourished. He is not diaphoretic.   HENT:      Head: Atraumatic.   Neck:      Trachea: No tracheal deviation.   Cardiovascular:      Rate and Rhythm: Normal rate.   Pulmonary:      Effort: Pulmonary effort is normal. No respiratory distress.      Breath sounds: No wheezing.    Abdominal:      General: Bowel sounds are normal. There is no distension.      Palpations: Abdomen is soft. There is no mass.      Tenderness: There is no abdominal tenderness. There is no guarding or rebound.   Genitourinary:     Prostate: Enlarged.      Rectum: Normal. No mass, tenderness, external hemorrhoid or internal hemorrhoid.   Skin:     General: Skin is warm and dry.   Neurological:      Mental Status: He is alert and oriented to person, place, and time.   Psychiatric:         Mood and Affect: Mood and affect normal.         Behavior: Behavior normal.         Thought Content: Thought content normal.         Judgment: Judgment normal.           Assessment and Plan:           Problem List Items Addressed This Visit    None         Follow up:   Discussed PHI, MRI, UroNav.  PHI today  MRI, uronav. Will call after MRI.    Roman Mcdonough

## 2022-02-03 ENCOUNTER — LAB VISIT (OUTPATIENT)
Dept: LAB | Facility: HOSPITAL | Age: 72
End: 2022-02-03
Attending: UROLOGY
Payer: MEDICARE

## 2022-02-03 ENCOUNTER — OFFICE VISIT (OUTPATIENT)
Dept: UROLOGY | Facility: CLINIC | Age: 72
End: 2022-02-03
Payer: MEDICARE

## 2022-02-03 VITALS
BODY MASS INDEX: 24.11 KG/M2 | HEART RATE: 61 BPM | HEIGHT: 72 IN | SYSTOLIC BLOOD PRESSURE: 114 MMHG | WEIGHT: 178 LBS | DIASTOLIC BLOOD PRESSURE: 64 MMHG | RESPIRATION RATE: 15 BRPM

## 2022-02-03 DIAGNOSIS — R97.20 ELEVATED PSA: ICD-10-CM

## 2022-02-03 DIAGNOSIS — R97.20 ELEVATED PSA: Primary | ICD-10-CM

## 2022-02-03 PROCEDURE — 99999 PR PBB SHADOW E&M-EST. PATIENT-LVL III: CPT | Mod: PBBFAC,,, | Performed by: UROLOGY

## 2022-02-03 PROCEDURE — 36415 COLL VENOUS BLD VENIPUNCTURE: CPT | Performed by: UROLOGY

## 2022-02-03 PROCEDURE — 99213 OFFICE O/P EST LOW 20 MIN: CPT | Mod: PBBFAC | Performed by: UROLOGY

## 2022-02-03 PROCEDURE — 99213 PR OFFICE/OUTPT VISIT, EST, LEVL III, 20-29 MIN: ICD-10-PCS | Mod: S$PBB,,, | Performed by: UROLOGY

## 2022-02-03 PROCEDURE — 99213 OFFICE O/P EST LOW 20 MIN: CPT | Mod: S$PBB,,, | Performed by: UROLOGY

## 2022-02-03 PROCEDURE — 99999 PR PBB SHADOW E&M-EST. PATIENT-LVL III: ICD-10-PCS | Mod: PBBFAC,,, | Performed by: UROLOGY

## 2022-02-03 RX ORDER — L.ACIDOPHIL/L.PLANTAR/BIFIDO 7 15B CELL
CAPSULE ORAL
COMMUNITY
End: 2022-11-14

## 2022-02-03 RX ORDER — TRIPROLIDINE/PSEUDOEPHEDRINE 2.5MG-60MG
TABLET ORAL EVERY 6 HOURS PRN
COMMUNITY
End: 2023-02-18

## 2022-02-07 LAB
-2 PROPSA (PHI): 19.7 PG/ML
FREE PSA (PHI): 1.9 NG/ML
PROSTATE HEALTH INDEX VALUE (PHI): 30.2
PSA FREE MFR SERPL: 22 %
PSA SERPL-MCNC: 8.5 NG/ML

## 2022-03-04 DIAGNOSIS — R52 PAIN: Primary | ICD-10-CM

## 2022-03-14 ENCOUNTER — HOSPITAL ENCOUNTER (OUTPATIENT)
Dept: RADIOLOGY | Facility: HOSPITAL | Age: 72
Discharge: HOME | End: 2022-03-14
Attending: ORTHOPAEDIC SURGERY
Payer: MEDICARE

## 2022-03-14 ENCOUNTER — OFFICE VISIT (OUTPATIENT)
Dept: ORTHOPEDICS | Facility: CLINIC | Age: 72
End: 2022-03-14
Payer: MEDICARE

## 2022-03-14 VITALS — HEIGHT: 73 IN | WEIGHT: 179.38 LBS | BODY MASS INDEX: 23.78 KG/M2

## 2022-03-14 DIAGNOSIS — M72.0 DUPUYTREN'S DISEASE OF PALM OF LEFT HAND: ICD-10-CM

## 2022-03-14 DIAGNOSIS — R52 PAIN: ICD-10-CM

## 2022-03-14 DIAGNOSIS — G89.29 CHRONIC THUMB PAIN, LEFT: ICD-10-CM

## 2022-03-14 DIAGNOSIS — M18.11 PRIMARY OSTEOARTHRITIS OF FIRST CARPOMETACARPAL JOINT OF RIGHT HAND: ICD-10-CM

## 2022-03-14 DIAGNOSIS — M79.645 CHRONIC THUMB PAIN, LEFT: ICD-10-CM

## 2022-03-14 DIAGNOSIS — M18.12 PRIMARY OSTEOARTHRITIS OF FIRST CARPOMETACARPAL JOINT OF LEFT HAND: Primary | ICD-10-CM

## 2022-03-14 PROCEDURE — 73130 XR HAND COMPLETE 3 VIEW LEFT: ICD-10-PCS | Mod: 26,LT,, | Performed by: RADIOLOGY

## 2022-03-14 PROCEDURE — 99204 PR OFFICE/OUTPT VISIT, NEW, LEVL IV, 45-59 MIN: ICD-10-PCS | Mod: S$PBB,25,, | Performed by: ORTHOPAEDIC SURGERY

## 2022-03-14 PROCEDURE — 99204 OFFICE O/P NEW MOD 45 MIN: CPT | Mod: S$PBB,25,, | Performed by: ORTHOPAEDIC SURGERY

## 2022-03-14 PROCEDURE — 73130 X-RAY EXAM OF HAND: CPT | Mod: TC,LT

## 2022-03-14 PROCEDURE — 20605 INTERMEDIATE JOINT ASPIRATION/INJECTION: L INTERCARPAL: ICD-10-PCS | Mod: S$PBB,LT,, | Performed by: ORTHOPAEDIC SURGERY

## 2022-03-14 PROCEDURE — 99999 PR PBB SHADOW E&M-EST. PATIENT-LVL III: CPT | Mod: PBBFAC,,, | Performed by: ORTHOPAEDIC SURGERY

## 2022-03-14 PROCEDURE — 20605 DRAIN/INJ JOINT/BURSA W/O US: CPT | Mod: PBBFAC | Performed by: ORTHOPAEDIC SURGERY

## 2022-03-14 PROCEDURE — 73130 X-RAY EXAM OF HAND: CPT | Mod: 26,LT,, | Performed by: RADIOLOGY

## 2022-03-14 PROCEDURE — 99213 OFFICE O/P EST LOW 20 MIN: CPT | Mod: PBBFAC | Performed by: ORTHOPAEDIC SURGERY

## 2022-03-14 PROCEDURE — 99999 PR PBB SHADOW E&M-EST. PATIENT-LVL III: ICD-10-PCS | Mod: PBBFAC,,, | Performed by: ORTHOPAEDIC SURGERY

## 2022-03-14 RX ORDER — METHYLPREDNISOLONE ACETATE 40 MG/ML
40 INJECTION, SUSPENSION INTRA-ARTICULAR; INTRALESIONAL; INTRAMUSCULAR; SOFT TISSUE
Status: DISCONTINUED | OUTPATIENT
Start: 2022-03-14 | End: 2022-03-14 | Stop reason: HOSPADM

## 2022-03-14 RX ADMIN — METHYLPREDNISOLONE ACETATE 40 MG: 40 INJECTION, SUSPENSION INTRALESIONAL; INTRAMUSCULAR; INTRASYNOVIAL; SOFT TISSUE at 09:03

## 2022-03-14 NOTE — PROCEDURES
Intermediate Joint Aspiration/Injection: L intercarpal    Date/Time: 3/14/2022 9:00 AM  Performed by: Kellie Joy MD  Authorized by: Kellie Joy MD     Consent Done?: Yes (Verbal)  Indications: Arthritis and pain  Timeout: Prior to procedure the correct patient, procedure, and site was verified      Location:  Wrist  Site:  L intercarpal  Prep: Patient was prepped and draped in usual sterile fashion    Needle size:  25 G  Medications:  40 mg methylPREDNISolone acetate 40 mg/mL  Patient tolerance:  Patient tolerated the procedure well with no immediate complications

## 2022-03-14 NOTE — PROGRESS NOTES
[facility]  History & Physical  Orthopedics    SUBJECTIVE:      COVID-19 attestation:  This patient was treated during the COVID-19 pandemic.  This was discussed with the patient, they are aware of our current policies and procedures, were given the option of delaying their visit and or switching to a virtual visit, delaying their surgery when applicable, and they elect to proceed.    Chief Complaint:   Chief Complaint   Patient presents with    Left Hand - Pain       Referring Provider: Ke Aguero, *     History of Present Illness:  Patient is a 71 y.o. right hand dominant male who presents today with complaints of left basilar thumb pain for years. He has trouble with daily activities including gripping jars and twisting due to the thumb pain.  He states that the pain is getting worse and when he  the handle of his motorcycle it is difficult.  He has decreased range of motion in the thumb and is having difficulty touching the thumb to the small finger on the left.  He has tried Naprosyn and an over-the-counter thumb brace which helps some.  He reports some discomfort in the right basilar thumb joint. He has a history of a right ring finger dislocation 4 years ago when he fell.  This was treated in a brace.  He denies any numbness or tingling.    He remembers having an uncle with Dupuytren's disease.  He reports no pain to the left palm.    The patient is a retired paleontologist and .    Onset of symptoms/DOI was years.    Symptoms are aggravated by activity and movement.    Symptoms are alleviated by rest.    Symptoms consist of pain and decreased ROM.    The patient rates their pain as a 4/10.    Attempted treatment(s) and/or interventions include rest, activity modification, anti-inflammatory medications and splinting/casting.     The patient denies any fevers, chills, N/V, D/C and presents for evaluation.       Past Medical History:   Diagnosis Date    Allergy   "   Anxiety     BPH (benign prostatic hypertrophy)     Elevated PSA     Prostatism     Skin cancer      Past Surgical History:   Procedure Laterality Date    FRACTURE SURGERY      left ankle    HERNIA REPAIR      hydrocele      SKIN LESION EXCISION       Review of patient's allergies indicates:   Allergen Reactions    Penicillins      Other reaction(s): Hives     Social History     Social History Narrative    Not on file     Family History   Problem Relation Age of Onset    Cancer Brother         osteo sarcoma    Diabetes Mother     Colon polyps Neg Hx     Heart disease Neg Hx     Amblyopia Neg Hx     Blindness Neg Hx     Cataracts Neg Hx     Glaucoma Neg Hx     Hypertension Neg Hx     Macular degeneration Neg Hx     Retinal detachment Neg Hx     Strabismus Neg Hx     Stroke Neg Hx     Thyroid disease Neg Hx          Current Outpatient Medications:     citalopram (CELEXA) 20 MG tablet, TAKE ONE TABLET BY MOUTH DAILY, Disp: 90 tablet, Rfl: 1    ibuprofen (ADVIL,MOTRIN) 100 mg/5 mL suspension, Take by mouth every 6 (six) hours as needed for Temperature greater than., Disp: , Rfl:     L.acidophil-L.plantar-Bifido 7 (UP4 PROBIOTICS ADULT) 15 billion cell Cap, Take by mouth., Disp: , Rfl:     tamsulosin (FLOMAX) 0.4 mg Cap, TAKE ONE DAILY, Disp: 90 capsule, Rfl: 3    tamsulosin (FLOMAX) 0.4 mg Cap, Take 1 capsule (0.4 mg total) by mouth once daily., Disp: 90 capsule, Rfl: 0    ROS    Review of Systems:  Constitutional: no fever or chills  Eyes: no visual changes  ENT: no nasal congestion or sore throat  Respiratory: no cough or shortness of breath  Cardiovascular: no chest pain  Gastrointestinal: no nausea or vomiting, tolerating diet  Musculoskeletal: pain, soreness and decreased ROM    OBJECTIVE:      Vital Signs (Most Recent):  Vitals:    03/14/22 0852   Weight: 81.4 kg (179 lb 5.5 oz)   Height: 6' 1" (1.854 m)     Body mass index is 23.66 kg/m².    Physical Exam    Physical " Exam:  Constitutional: The patient appears well-developed and well-nourished. No distress.   Head: Normocephalic and atraumatic.   Nose: Nose normal.   Eyes: Conjunctivae and EOM are normal.   Neck: No tracheal deviation present.   Cardiovascular: Normal rate and intact distal pulses.    Pulmonary/Chest: Effort normal. No respiratory distress.   Abdominal: There is no guarding.   Lymphatic: Negative for adenopathy   Neurological: The patient is alert.   Psychiatric: The patient has a normal mood and affect.     Cervical Exam:  ROM full, supple  Negative Spurling's  Negative Castro's    Bilateral Hand/Wrist Examination:    Observation/Inspection:  Swelling  Left thumb CMC    Deformity  none  Discoloration  none     Scars   none    Atrophy  none    HAND/WRIST EXAMINATION:  Finkelstein's Test   Neg  WHAT Test    Neg  Snuff box tenderness   Neg  Cruz's Test    Neg  Hook of Hamate Tenderness  Neg  CMC grind    Positive bilaterally  Circumduction test   Positive bilaterally  TFCC Compression Test  Neg    Decreased left thumb CMC motion otherwise ROM hand/wrist/elbow full; Dupuytren's nodule left palm ring finger, no contracture    Neurovascular Exam:  Digits WWP, brisk CR < 3s throughout  NVI motor/LTS to M/R/U nerves, radial pulse 2+  2+ biceps and brachioradialis reflexes  Tinel's Test - Carpal Tunnel  Neg  Tinel's Test - Cubital Tunnel  Neg  Phalen's Test    Neg  Median Nerve Compression Test Neg    Diagnostic Results:     X-rays AP, lateral and oblique left hand taken today are independently reviewed by me and show left Eaton stage III basilar thumb arthrits.         ASSESSMENT/PLAN:      71 y.o. yo male with   Encounter Diagnoses   Name Primary?    Chronic thumb pain, left     Primary osteoarthritis of first carpometacarpal joint of left hand Yes    Primary osteoarthritis of first carpometacarpal joint of right hand     Dupuytren's disease of palm of left hand       Plan:  We have discussed the natural history  of basilar thumb arthritis including treatment options such as splinting, oral and topical anti-inflammatories, cortisone injections and surgery. I have given a left thumb 3D braces for comfort.    We have discussed the natural history of Dupuytren's disease as well as treatment options including observation, Xiaflex injections and surgical partial fasciectomy. We will observe this for now.    -I have offered him a selective injection. I have explained the risks, benefits, and alternatives of the procedure in detail.  The patient voices understanding and all questions have been answered. The patient agrees to proceed as planned. So after a sterile prep of the skin in the normal fashion the left basilar thumb joint was injected using a 25 gauge needle with a combination of 1cc 1% plain lidocaine and 40 mg of methylprednisolone.  The patient is cautioned and immediate relief of pain is secondary to the local anesthetic and will be temporary.  After the anesthetic wears off there may be a increase in pain that may last for a few hours or a few days and they should use ice to help alleviate this flair up of pain. Patient tolerated the procedure well.    - F/U in 6 months or as needed for any worsening of symptoms  - Call with any questions/concerns in the interim       The patient's pathophysiology was explained in detail with reference to x-rays, models, other visual aids as appropriate.  Treatment options were discussed in detail.  Questions were invited and answered to the patient's satisfaction. I reviewed Primary care , and other specialty's notes to better coordinate patient's care.          Kellie Joy MD     Please be aware that this note has been generated with the assistance of hollis voice-to-text.  Please excuse any spelling or grammatical errors.

## 2022-03-16 ENCOUNTER — HOSPITAL ENCOUNTER (OUTPATIENT)
Dept: RADIOLOGY | Facility: HOSPITAL | Age: 72
Discharge: HOME | End: 2022-03-16
Attending: UROLOGY
Payer: MEDICARE

## 2022-03-16 DIAGNOSIS — R97.20 ELEVATED PSA: ICD-10-CM

## 2022-03-16 PROCEDURE — 72197 MRI PELVIS W/O & W/DYE: CPT | Mod: 26,,, | Performed by: RADIOLOGY

## 2022-03-16 PROCEDURE — 25500020 PHARM REV CODE 255: Performed by: UROLOGY

## 2022-03-16 PROCEDURE — A9585 GADOBUTROL INJECTION: HCPCS | Performed by: UROLOGY

## 2022-03-16 PROCEDURE — 72197 MRI PELVIS W/O & W/DYE: CPT | Mod: TC

## 2022-03-16 PROCEDURE — 72197 MRI PROSTATE W W/O CONTRAST: ICD-10-PCS | Mod: 26,,, | Performed by: RADIOLOGY

## 2022-03-16 RX ORDER — GADOBUTROL 604.72 MG/ML
10 INJECTION INTRAVENOUS
Status: COMPLETED | OUTPATIENT
Start: 2022-03-16 | End: 2022-03-16

## 2022-03-16 RX ADMIN — GADOBUTROL 10 ML: 604.72 INJECTION INTRAVENOUS at 04:03

## 2022-03-17 ENCOUNTER — PATIENT MESSAGE (OUTPATIENT)
Dept: UROLOGY | Facility: CLINIC | Age: 72
End: 2022-03-17
Payer: MEDICARE

## 2022-03-18 DIAGNOSIS — R97.20 ELEVATED PSA: Primary | ICD-10-CM

## 2022-03-18 RX ORDER — CEFTRIAXONE 1 G/1
1 INJECTION, POWDER, FOR SOLUTION INTRAMUSCULAR; INTRAVENOUS
Status: COMPLETED | OUTPATIENT
Start: 2022-03-25 | End: 2022-04-12

## 2022-03-18 RX ORDER — LIDOCAINE HYDROCHLORIDE 20 MG/ML
JELLY TOPICAL
Status: COMPLETED | OUTPATIENT
Start: 2022-03-25 | End: 2022-04-12

## 2022-03-18 RX ORDER — CIPROFLOXACIN 500 MG/1
500 TABLET ORAL ONCE
Qty: 1 TABLET | Refills: 0 | Status: SHIPPED | OUTPATIENT
Start: 2022-03-18 | End: 2022-03-18

## 2022-03-22 ENCOUNTER — TELEPHONE (OUTPATIENT)
Dept: UROLOGY | Facility: CLINIC | Age: 72
End: 2022-03-22
Payer: MEDICARE

## 2022-03-22 NOTE — TELEPHONE ENCOUNTER
Called pt regarding request to change date for UroNav biopsy. Pt scheduled for 4/12/22 @ 9:00 am. Pt verbalized understanding.

## 2022-04-09 ENCOUNTER — NURSE TRIAGE (OUTPATIENT)
Dept: ADMINISTRATIVE | Facility: CLINIC | Age: 72
End: 2022-04-09
Payer: MEDICARE

## 2022-04-09 NOTE — TELEPHONE ENCOUNTER
OOC incoming call - Pt c/o  Needing antibiotics (starting on Monday) prior to the prostate biopsy scheduled on Tuesday 4/12/22 per his paperwork given to him and Pt states he was not given any AB. No AB on Pt's profile at this time. Info only protocol followed and pt advised to await his doctor to respond to message. Encounter routed to PCP and urologist to follow up as a henri priority.    Reason for Disposition   Health Information question, no triage required and triager able to answer question    Protocols used: INFORMATION ONLY CALL - NO TRIAGE-A-

## 2022-04-11 ENCOUNTER — PATIENT MESSAGE (OUTPATIENT)
Dept: UROLOGY | Facility: CLINIC | Age: 72
End: 2022-04-11
Payer: MEDICARE

## 2022-04-12 ENCOUNTER — PROCEDURE VISIT (OUTPATIENT)
Dept: UROLOGY | Facility: CLINIC | Age: 72
End: 2022-04-12
Payer: MEDICARE

## 2022-04-12 VITALS
DIASTOLIC BLOOD PRESSURE: 61 MMHG | HEIGHT: 73 IN | BODY MASS INDEX: 23.86 KG/M2 | WEIGHT: 180 LBS | TEMPERATURE: 97 F | SYSTOLIC BLOOD PRESSURE: 113 MMHG | HEART RATE: 49 BPM | RESPIRATION RATE: 16 BRPM

## 2022-04-12 DIAGNOSIS — R97.20 ELEVATED PSA: Primary | ICD-10-CM

## 2022-04-12 PROCEDURE — 88305 TISSUE EXAM BY PATHOLOGIST: ICD-10-PCS | Mod: 26,,, | Performed by: STUDENT IN AN ORGANIZED HEALTH CARE EDUCATION/TRAINING PROGRAM

## 2022-04-12 PROCEDURE — G0416 PROSTATE BIOPSY, ANY MTHD: HCPCS | Performed by: STUDENT IN AN ORGANIZED HEALTH CARE EDUCATION/TRAINING PROGRAM

## 2022-04-12 PROCEDURE — 88305 TISSUE EXAM BY PATHOLOGIST: CPT | Mod: 26,,, | Performed by: STUDENT IN AN ORGANIZED HEALTH CARE EDUCATION/TRAINING PROGRAM

## 2022-04-12 PROCEDURE — 55700 TRANSRECTAL ULTRASOUND W/ BIOPSY: CPT | Mod: PBBFAC | Performed by: UROLOGY

## 2022-04-12 PROCEDURE — 55700 TRANSRECTAL ULTRASOUND W/ BIOPSY: ICD-10-PCS | Mod: S$PBB,,, | Performed by: UROLOGY

## 2022-04-12 PROCEDURE — 76872 TRANSRECTAL ULTRASOUND W/ BIOPSY: ICD-10-PCS | Mod: 26,S$PBB,, | Performed by: UROLOGY

## 2022-04-12 PROCEDURE — 96372 THER/PROPH/DIAG INJ SC/IM: CPT | Mod: PBBFAC

## 2022-04-12 PROCEDURE — 88305 TISSUE EXAM BY PATHOLOGIST: CPT | Performed by: STUDENT IN AN ORGANIZED HEALTH CARE EDUCATION/TRAINING PROGRAM

## 2022-04-12 PROCEDURE — 76872 US TRANSRECTAL: CPT | Mod: 26,S$PBB,, | Performed by: UROLOGY

## 2022-04-12 RX ORDER — LIDOCAINE HYDROCHLORIDE 10 MG/ML
20 INJECTION INFILTRATION; PERINEURAL
Status: COMPLETED | OUTPATIENT
Start: 2022-04-12 | End: 2022-04-12

## 2022-04-12 RX ADMIN — CEFTRIAXONE 1 G: 1 INJECTION, POWDER, FOR SOLUTION INTRAMUSCULAR; INTRAVENOUS at 09:04

## 2022-04-12 RX ADMIN — LIDOCAINE HYDROCHLORIDE: 20 JELLY TOPICAL at 09:04

## 2022-04-12 RX ADMIN — LIDOCAINE HYDROCHLORIDE 20 ML: 10 INJECTION, SOLUTION INFILTRATION; PERINEURAL at 09:04

## 2022-04-12 NOTE — PROCEDURES
"Transrectal Ultrasound w/ Biopsy    Date/Time: 4/12/2022 9:23 AM  Performed by: Roman Mcdonough MD  Authorized by: Roman Mcdonough MD     Consent Done?:  Yes (Written)  Time out: Immediately prior to procedure a "time out" was called to verify the correct patient, procedure, equipment, support staff and site/side marked as required.    Indications: Elevated PSA    Preparation: Patient was prepped and draped in usual sterile fashion    Position:  Left lateral  Anesthesia:  Pudendal nerve block, 20cc's 1% Lidocaine and Lidocaine jelly  Prostate Size:  63 ccs  Lesions:: Yes         Type:  Hypoechoic  Left Base Biopsies: 4  Left Mid Biopsies: 2  Left Erving Biopsies: 2  Right Base Biopsies: 2  Right Mid Biopsies: 2  Right Erving Biopsies: 2  Total Biopsies:  14    Patient tolerance:  Patient tolerated the procedure well with no immediate complications      The risks and benefits of the procedure were explained to the patient and consent was obtained.  The patient laid in the lateral decubitus position.  10cc of lidocaine jelly was used for local analgesia in the rectum.  The ultrasound probe was advanced into the rectum. The prostate was visualized.  There was was not a median lobe.  20cc of 1% lidocaine without epi was used for a prostatic nerve block.    At this point, a sweep of the prostate was performed from base to apex and the transverse plane using the ultrasound and URONAV platform.  Landmarks were then labeled with anterior, posterior, right, left, base and apex were labeled in the axial as well as sagittal planes.  Segmentation was then performed and manual adjustments were made as needed starting in the sagittal plane followed by the axial plane.  At this point, alignment of the ultrasound with MRI images was ensured using the toggle between ultrasound and MRI.       At this point elastic deformation was computed.  Our images were satisfactory.   Excellent co-registration  Target 1- left base- 4 " cores.

## 2022-04-12 NOTE — PATIENT INSTRUCTIONS
What to Expect After a Prostate Biopsy    You may have mild bleeding from the rectum or urine for about 1 week to 1 month, or in your ejaculate for several months. This bleeding is normal and expected, and it will stop. You may have mild discomfort in your rectal or urethral area for 24-48 hours.    You cannot do any strenuous lifting, straining, or exercising for 24 hours. You may return to full activity the day after the biopsy.    You may continue to take all your regular medications after the procedure except for the blood thinners.    You may resume all blood-thinning medications once you no longer see any bleeding or whenever your physician prescribing the medication says it is all right to do so. You may take Tylenol if you have a fever and your temperature is less than 100° F or if you have some discomfort.    You will receive a call from the Urology Department at [facility] with the results of your prostate biopsy within one week.    Signs and Symptoms to Report    Call your [facility] urologist at [phone] if you develop any of the following:  Temperature greater than 101°  F  Inability to urinate  A large amount of bleeding from the rectum or in the urine  Persistent or severe pain    After hours or on weekends, you may reach a urology resident on call at this number: [phone].

## 2022-04-19 ENCOUNTER — PATIENT MESSAGE (OUTPATIENT)
Dept: UROLOGY | Facility: CLINIC | Age: 72
End: 2022-04-19
Payer: MEDICARE

## 2022-04-19 DIAGNOSIS — C61 PROSTATE CANCER: Primary | ICD-10-CM

## 2022-04-28 ENCOUNTER — PATIENT MESSAGE (OUTPATIENT)
Dept: UROLOGY | Facility: CLINIC | Age: 72
End: 2022-04-28
Payer: MEDICARE

## 2022-05-06 LAB
FINAL PATHOLOGIC DIAGNOSIS: NORMAL
Lab: NORMAL
SUPPLEMENTAL DIAGNOSIS: NORMAL

## 2022-05-09 ENCOUNTER — PES CALL (OUTPATIENT)
Dept: ADMINISTRATIVE | Facility: CLINIC | Age: 72
End: 2022-05-09
Payer: MEDICARE

## 2022-05-10 ENCOUNTER — OFFICE VISIT (OUTPATIENT)
Dept: UROLOGY | Facility: CLINIC | Age: 72
End: 2022-05-10
Payer: MEDICARE

## 2022-05-10 ENCOUNTER — PATIENT MESSAGE (OUTPATIENT)
Dept: UROLOGY | Facility: CLINIC | Age: 72
End: 2022-05-10

## 2022-05-10 DIAGNOSIS — C61 PROSTATE CANCER: Primary | ICD-10-CM

## 2022-05-10 PROCEDURE — 99214 PR OFFICE/OUTPT VISIT, EST, LEVL IV, 30-39 MIN: ICD-10-PCS | Mod: 95,,, | Performed by: UROLOGY

## 2022-05-10 PROCEDURE — 99214 OFFICE O/P EST MOD 30 MIN: CPT | Mod: 95,,, | Performed by: UROLOGY

## 2022-05-10 NOTE — PROGRESS NOTES
Clinic Note  5/10/2022      Subjective:         Chief Complaint:   HPI  Nilesh Almaraz is a 71 y.o. male with prostate cancer. Consult from Dr. Aguero.  I did biopsy 2/9/2012- B9 path with acute and chronic inflammation(PSA 3.5).  Negative family history. 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  On flomax for BPH.     5/10/2022- The patient location is: home  The chief complaint leading to consultation is: prostate cancer    Visit type: audiovisual    Face to Face time with patient: 22 minutes  35 minutes minutes of total time spent on the encounter, which includes face to face time and non-face to face time preparing to see the patient (eg, review of tests), Obtaining and/or reviewing separately obtained history, Documenting clinical information in the electronic or other health record, Independently interpreting results (not separately reported) and communicating results to the patient/family/caregiver, or Care coordination (not separately reported).         Each patient to whom he or she provides medical services by telemedicine is:  (1) informed of the relationship between the physician and patient and the respective role of any other health care provider with respect to management of the patient; and (2) notified that he or she may decline to receive medical services by telemedicine and may withdraw from such care at any time.    Notes:     FH -negative  PSA - 8.4  Stage - T1C  Volume - 63 ccs  MRI - 3/16/2022- 1.4 cm LBPZ PI-RADS  Biopsy - 4/12/2022- Krystian 4+3 (GG3) right base 2/2 17%.  MARY ELLEN Score - 5 intermediate  NCCN - unfavorable intermediate  Germline testing -not indicated  Somatic testing - Prolaris score 2.8 ( 4.6 % METS rate at 10 years with AS, 3.1 % mets rate with single therapy at 10 years.    Lab Results   Component Value Date    PSA 7.0 (H) 11/08/2018    PSA 4.7 (H) 05/22/2017    PSA 6.8 (H) 03/29/2016    PSA 5.0 (H) 10/16/2013    PSA  3.52 09/27/2012    PSA 3.4 08/04/2011    PSA 4.5 (H) 07/20/2011    PSADIAG 8.4 (H) 01/12/2022    PSADIAG 4.3 (H) 10/03/2016    PSATOTAL 3.5 08/04/2011    PSAFREE 0.83 08/04/2011    PSAFREEPCT 23.71 08/04/2011      Past Medical History:   Diagnosis Date    Allergy     Anxiety     BPH (benign prostatic hypertrophy)     Elevated PSA     Prostatism     Skin cancer      Family History   Problem Relation Age of Onset    Cancer Brother         osteo sarcoma    Diabetes Mother     Colon polyps Neg Hx     Heart disease Neg Hx     Amblyopia Neg Hx     Blindness Neg Hx     Cataracts Neg Hx     Glaucoma Neg Hx     Hypertension Neg Hx     Macular degeneration Neg Hx     Retinal detachment Neg Hx     Strabismus Neg Hx     Stroke Neg Hx     Thyroid disease Neg Hx      Social History     Socioeconomic History    Marital status:      Spouse name: Palmira    Number of children: 2   Occupational History    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iest     Employer: [employer]   Tobacco Use    Smoking status: Never Smoker    Smokeless tobacco: Never Used   Substance and Sexual Activity    Alcohol use: Yes     Alcohol/week: 2.0 - 3.0 standard drinks     Types: 2 - 3 Glasses of wine per week     Comment: daily    Drug use: No    Sexual activity: Yes     Partners: Female     Social Determinants of Health     Financial Resource Strain: Low Risk     Difficulty of Paying Living Expenses: Not hard at all   Food Insecurity: No Food Insecurity    Worried About Running Out of Food in the Last Year: Never true    Ran Out of Food in the Last Year: Never true   Transportation Needs: No Transportation Needs    Lack of Transportation (Medical): No    Lack of Transportation (Non-Medical): No   Physical Activity: Sufficiently Active    Days of Exercise per Week: 5 days    Minutes of Exercise per Session: 30 min   Stress: No Stress Concern Present    Feeling of Stress : Not at all   Social Connections: Unknown    Frequency of  "Communication with Friends and Family: Three times a week    Frequency of Social Gatherings with Friends and Family: Once a week    Active Member of Clubs or Organizations: Yes    Attends Club or Organization Meetings: More than 4 times per year    Marital Status:    Housing Stability: Low Risk     Unable to Pay for Housing in the Last Year: No    Number of Places Lived in the Last Year: 2    Unstable Housing in the Last Year: No     Past Surgical History:   Procedure Laterality Date    FRACTURE SURGERY      left ankle    HERNIA REPAIR      hydrocele      SKIN LESION EXCISION       Patient Active Problem List   Diagnosis    Anxiety    Elevated PSA    Hemorrhoids     Review of Systems      Objective:      There were no vitals taken for this visit.  Estimated body mass index is 23.75 kg/m² as calculated from the following:    Height as of 4/12/22: 6' 1" (1.854 m).    Weight as of 4/12/22: 81.6 kg (180 lb).  Physical Exam      Assessment and Plan:           Problem List Items Addressed This Visit    None         Follow up:   Today's visit was spent almost entirely on counseling. We reviewed his diagnosis, stage, grade, risk group, and prognosis. We discussed [place] (NCCN) and MARY ELLEN risk stratification  We discussed the concept of low risk, intermediate risk, and high risk disease. We also reviewed the NCCN treatment nomogram.We discussed the different treatment options including active surveillance (as well as the surveillance protocol), prostate brachytherapy, EBRT, SBRT (stereotactic body radiation therapy),cryotherapy, HIFU and both open and robotic prostatectomy.We also discussed the advantages, disadvantages, risks and benefits, as well as complications of each option. Regarding radiation therapy we discussed treatment planning, the different techniques, short and long term complications. These included radiation cystitis, radiation proctitis, and impotence. We discussed success, failure, and " salvage therapeutic options.Also discussed the use of SpaceOAR for brachytherapy and EBRT and fiducials for EBRT.   We discussed surgical therapy in depth including preoperative preparation, surgical technique (including bladder neck and nerve-sparing techniques), postoperative recuperation and recovery, and short and long term complications including UTI, bleeding, blood clots,catheter dislodgement, etc. We discussed the risks of reoperation, incontinence, impotence, and recurrence. We discussed preop and postop Kegels, post op penile rehab, and treatment options for incontinence and impotence. We discussed rates of cancer free survival and recurrence, as well as salvage therapeutic options. We discussed the possible  indications for adjuvant radiation therapy.  Patient to get treatment in [place]. Recommend F/U in 6 months after completion.    Roman Mcdonough

## 2022-05-20 ENCOUNTER — PATIENT MESSAGE (OUTPATIENT)
Dept: UROLOGY | Facility: CLINIC | Age: 72
End: 2022-05-20
Payer: MEDICARE

## 2022-08-10 DIAGNOSIS — F41.9 ANXIETY: ICD-10-CM

## 2022-08-10 RX ORDER — CITALOPRAM 20 MG/1
TABLET, FILM COATED ORAL
Qty: 90 TABLET | Refills: 1 | Status: SHIPPED | OUTPATIENT
Start: 2022-08-10 | End: 2022-11-14

## 2022-08-10 NOTE — TELEPHONE ENCOUNTER
No new care gaps identified.  [facility] Embedded Care Gaps. Reference number: [id number]. 8/10/2022   10:08:11 AM JANETTET

## 2022-08-10 NOTE — TELEPHONE ENCOUNTER
Refill Decision Note   Nilesh Almaraz  is requesting a refill authorization.  Brief Assessment and Rationale for Refill:  Approve     Medication Therapy Plan:       Medication Reconciliation Completed: No   Comments:     No Care Gaps recommended.     Note composed:11:21 AM 08/10/2022

## 2022-10-03 NOTE — TELEPHONE ENCOUNTER
AdventHealth Palm Coast Parkway Medical I spoke to pt and scheduled lab for Friday 2- at 9:15a.

## 2022-11-05 ENCOUNTER — PATIENT MESSAGE (OUTPATIENT)
Dept: UROLOGY | Facility: CLINIC | Age: 72
End: 2022-11-05
Payer: MEDICARE

## 2022-11-10 ENCOUNTER — TELEPHONE (OUTPATIENT)
Dept: UROLOGY | Facility: CLINIC | Age: 72
End: 2022-11-10
Payer: MEDICARE

## 2022-11-10 DIAGNOSIS — C61 PROSTATE CANCER: Primary | ICD-10-CM

## 2022-11-11 ENCOUNTER — LAB VISIT (OUTPATIENT)
Dept: LAB | Facility: HOSPITAL | Age: 72
End: 2022-11-11
Attending: UROLOGY
Payer: MEDICARE

## 2022-11-11 DIAGNOSIS — C61 PROSTATE CANCER: ICD-10-CM

## 2022-11-11 LAB — COMPLEXED PSA SERPL-MCNC: 3.1 NG/ML (ref 0–4)

## 2022-11-11 PROCEDURE — 36415 COLL VENOUS BLD VENIPUNCTURE: CPT | Mod: PO | Performed by: UROLOGY

## 2022-11-11 PROCEDURE — 84153 ASSAY OF PSA TOTAL: CPT | Performed by: UROLOGY

## 2022-11-14 ENCOUNTER — OFFICE VISIT (OUTPATIENT)
Dept: UROLOGY | Facility: CLINIC | Age: 72
End: 2022-11-14
Payer: MEDICARE

## 2022-11-14 VITALS
SYSTOLIC BLOOD PRESSURE: 128 MMHG | HEART RATE: 60 BPM | WEIGHT: 179.63 LBS | DIASTOLIC BLOOD PRESSURE: 74 MMHG | BODY MASS INDEX: 23.81 KG/M2 | HEIGHT: 73 IN

## 2022-11-14 DIAGNOSIS — C61 PROSTATE CANCER: Primary | ICD-10-CM

## 2022-11-14 PROCEDURE — 99999 PR PBB SHADOW E&M-EST. PATIENT-LVL III: ICD-10-PCS | Mod: PBBFAC,,, | Performed by: UROLOGY

## 2022-11-14 PROCEDURE — 99999 PR PBB SHADOW E&M-EST. PATIENT-LVL III: CPT | Mod: PBBFAC,,, | Performed by: UROLOGY

## 2022-11-14 PROCEDURE — 99213 OFFICE O/P EST LOW 20 MIN: CPT | Mod: S$PBB,,, | Performed by: UROLOGY

## 2022-11-14 PROCEDURE — 99213 OFFICE O/P EST LOW 20 MIN: CPT | Mod: PBBFAC | Performed by: UROLOGY

## 2022-11-14 PROCEDURE — 99213 PR OFFICE/OUTPT VISIT, EST, LEVL III, 20-29 MIN: ICD-10-PCS | Mod: S$PBB,,, | Performed by: UROLOGY

## 2022-11-14 RX ORDER — TAMSULOSIN HYDROCHLORIDE 0.4 MG/1
1 CAPSULE ORAL DAILY
COMMUNITY
Start: 2022-08-24 | End: 2023-02-14 | Stop reason: SDUPTHER

## 2022-11-14 NOTE — PROGRESS NOTES
Clinic Note  11/14/2022    Subjective:     Chief Complaint:   HPI  Nilesh Almaraz is a 71 y.o. male with prostate cancer. Consult from Dr. Aguero.  Dr. Mcdonough did biopsy 2/9/2012- B9 path with acute and chronic inflammation(PSA 3.5).  Negative family history. 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 Has place in [place] since [place].  On flomax for BPH.  Underwent XRT in [place], [place].   PSA decreased since treatment.     Of note, the patient opted out of any androgen deprivation therapy around his external beam radiation.  He also reports opting for fewer treatments in higher dosages which is not what his radiation oncologist suggested.  However, on further discussion it sounds like this just sounds like hypofractionation.  He reports getting the same cumulative dose of radiation.      FH -negative  PSA - 8.4  Stage - T1C  Volume - 63 ccs  MRI - 3/16/2022- 1.4 cm LBPZ PI-RADS  Biopsy - 4/12/2022- Krystian 4+3 (GG3) right base 2/2 17%.  MARY ELLEN Score - 5 intermediate  NCCN - unfavorable intermediate  Germline testing -not indicated  Somatic testing - Prolaris score 2.8 ( 4.6 % METS rate at 10 years with AS, 3.1 % mets rate with single therapy at 10 years.    Lab Results   Component Value Date    PSA 7.0 (H) 11/08/2018    PSA 4.7 (H) 05/22/2017    PSA 6.8 (H) 03/29/2016    PSA 5.0 (H) 10/16/2013    PSA 3.52 09/27/2012    PSA 3.4 08/04/2011    PSA 4.5 (H) 07/20/2011    PSADIAG 3.1 11/11/2022    PSADIAG 8.4 (H) 01/12/2022    PSADIAG 4.3 (H) 10/03/2016    PSATOTAL 3.5 08/04/2011    PSAFREE 0.83 08/04/2011    PSAFREEPCT 23.71 08/04/2011      Past Medical History:   Diagnosis Date    Allergy     Anxiety     BPH (benign prostatic hypertrophy)     Prostatism     Skin cancer      Family History   Problem Relation Age of Onset    Cancer Brother         osteo sarcoma    Diabetes Mother     Colon polyps Neg Hx     Heart disease Neg Hx     Amblyopia Neg Hx     Blindness Neg Hx     Cataracts Neg Hx      Glaucoma Neg Hx     Hypertension Neg Hx     Macular degeneration Neg Hx     Retinal detachment Neg Hx     Strabismus Neg Hx     Stroke Neg Hx     Thyroid disease Neg Hx      Social History     Socioeconomic History    Marital status:      Spouse name: Palmira    Number of children: 2   Occupational History    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iest     Employer: [employer]   Tobacco Use    Smoking status: Never    Smokeless tobacco: Never   Substance and Sexual Activity    Alcohol use: Yes     Alcohol/week: 2.0 - 3.0 standard drinks     Types: 2 - 3 Glasses of wine per week     Comment: daily    Drug use: No    Sexual activity: Yes     Partners: Female     Social Determinants of Health     Financial Resource Strain: Low Risk     Difficulty of Paying Living Expenses: Not hard at all   Food Insecurity: No Food Insecurity    Worried About Running Out of Food in the Last Year: Never true    Ran Out of Food in the Last Year: Never true   Transportation Needs: No Transportation Needs    Lack of Transportation (Medical): No    Lack of Transportation (Non-Medical): No   Physical Activity: Sufficiently Active    Days of Exercise per Week: 5 days    Minutes of Exercise per Session: 30 min   Stress: No Stress Concern Present    Feeling of Stress : Not at all   Social Connections: Unknown    Frequency of Communication with Friends and Family: Three times a week    Frequency of Social Gatherings with Friends and Family: Once a week    Active Member of Clubs or Organizations: Yes    Attends Club or Organization Meetings: More than 4 times per year    Marital Status:    Housing Stability: Low Risk     Unable to Pay for Housing in the Last Year: No    Number of Places Lived in the Last Year: 2    Unstable Housing in the Last Year: No     Past Surgical History:   Procedure Laterality Date    FRACTURE SURGERY      left ankle    HERNIA REPAIR      hydrocele      SKIN LESION EXCISION       Patient Active Problem List   Diagnosis    Anxiety  "   Hemorrhoids    Prostate cancer     Review of Systems      All other systems reviewed and negative except pertinent positives noted in HPI.    Objective:      There were no vitals taken for this visit.  Estimated body mass index is 23.75 kg/m² as calculated from the following:    Height as of 4/12/22: 6' 1" (1.854 m).    Weight as of 4/12/22: 81.6 kg (180 lb).  Physical Exam  Constitutional:       General: He is not in acute distress.     Appearance: He is well-developed.   HENT:      Head: Normocephalic and atraumatic.   Eyes:      General: No scleral icterus.  Neck:      Trachea: No tracheal deviation.   Pulmonary:      Effort: Pulmonary effort is normal. No respiratory distress.   Neurological:      Mental Status: He is alert and oriented to person, place, and time.   Psychiatric:         Behavior: Behavior normal.         Thought Content: Thought content normal.         Judgment: Judgment normal.         Assessment and Plan:           Problem List Items Addressed This Visit          Oncology    Prostate cancer - Primary       Follow up:     -psa reviewed.   -repeat psa in 3 months--at his request prior to going back to [facility].   F/u with Dr. Mcdonough at patient's request.     Aaron Jolly          "

## 2022-12-13 ENCOUNTER — PES CALL (OUTPATIENT)
Dept: ADMINISTRATIVE | Facility: CLINIC | Age: 72
End: 2022-12-13
Payer: MEDICARE

## 2022-12-13 PROBLEM — E78.00 HYPERCHOLESTEREMIA: Status: ACTIVE | Noted: 2022-12-13

## 2022-12-13 PROBLEM — K64.9 HEMORRHOIDS: Status: RESOLVED | Noted: 2022-01-19 | Resolved: 2022-12-13

## 2022-12-14 ENCOUNTER — PATIENT OUTREACH (OUTPATIENT)
Dept: ADMINISTRATIVE | Facility: HOSPITAL | Age: 72
End: 2022-12-14
Payer: MEDICARE

## 2022-12-15 ENCOUNTER — OFFICE VISIT (OUTPATIENT)
Dept: INTERNAL MEDICINE | Facility: CLINIC | Age: 72
End: 2022-12-15
Payer: MEDICARE

## 2022-12-15 ENCOUNTER — PATIENT MESSAGE (OUTPATIENT)
Dept: INTERNAL MEDICINE | Facility: CLINIC | Age: 72
End: 2022-12-15

## 2022-12-15 VITALS
DIASTOLIC BLOOD PRESSURE: 80 MMHG | OXYGEN SATURATION: 98 % | SYSTOLIC BLOOD PRESSURE: 120 MMHG | BODY MASS INDEX: 24.33 KG/M2 | RESPIRATION RATE: 14 BRPM | WEIGHT: 179.63 LBS | HEIGHT: 72 IN | HEART RATE: 56 BPM

## 2022-12-15 DIAGNOSIS — R73.9 ELEVATED BLOOD SUGAR: Primary | ICD-10-CM

## 2022-12-15 DIAGNOSIS — C61 PROSTATE CANCER: ICD-10-CM

## 2022-12-15 DIAGNOSIS — E78.00 HYPERCHOLESTEREMIA: ICD-10-CM

## 2022-12-15 DIAGNOSIS — Z86.16 HISTORY OF COVID-19: ICD-10-CM

## 2022-12-15 DIAGNOSIS — R00.1 BRADYCARDIA: ICD-10-CM

## 2022-12-15 DIAGNOSIS — Z00.00 ENCOUNTER FOR PREVENTIVE HEALTH EXAMINATION: Primary | ICD-10-CM

## 2022-12-15 PROCEDURE — 99215 OFFICE O/P EST HI 40 MIN: CPT | Mod: PBBFAC,PO | Performed by: NURSE PRACTITIONER

## 2022-12-15 PROCEDURE — 99999 PR PBB SHADOW E&M-EST. PATIENT-LVL V: ICD-10-PCS | Mod: PBBFAC,,, | Performed by: NURSE PRACTITIONER

## 2022-12-15 PROCEDURE — G0439 PR MEDICARE ANNUAL WELLNESS SUBSEQUENT VISIT: ICD-10-PCS | Mod: ,,, | Performed by: NURSE PRACTITIONER

## 2022-12-15 PROCEDURE — 99999 PR PBB SHADOW E&M-EST. PATIENT-LVL V: CPT | Mod: PBBFAC,,, | Performed by: NURSE PRACTITIONER

## 2022-12-15 PROCEDURE — G0439 PPPS, SUBSEQ VISIT: HCPCS | Mod: ,,, | Performed by: NURSE PRACTITIONER

## 2022-12-15 RX ORDER — B-COMPLEX WITH VITAMIN C
1 TABLET ORAL DAILY
COMMUNITY

## 2022-12-15 RX ORDER — PSYLLIUM HUSK 0.4 G
CAPSULE ORAL
COMMUNITY

## 2022-12-15 NOTE — PROGRESS NOTES
Nilesh Almaraz presented for a  Medicare AWV and comprehensive Health Risk Assessment today. The following components were reviewed and updated:    Medical history  Family History  Social history  Allergies and Current Medications  Health Risk Assessment  Health Maintenance  Care Team     ** See Completed Assessments for Annual Wellness Visit within the encounter summary.**       The following assessments were completed:  Living Situation  CAGE  Depression Screening  Timed Get Up and Go  Whisper Test--abnormal-known SNHL- prior audiology test-per patient  Cognitive Function Screening  Nutrition Screening  ADL Screening  PAQ Screening    Vitals:    12/15/22 0807   BP: 120/80   BP Location: Left arm   Patient Position: Sitting   Pulse: (!) 56   Resp: 14   SpO2: 98%   Weight: 81.5 kg (179 lb 9.6 oz)   Height: 6' (1.829 m)     Body mass index is 24.36 kg/m².  Physical Exam  Constitutional:       Comments: Younger in appearance than age   HENT:      Right Ear: There is no impacted cerumen.      Left Ear: There is no impacted cerumen.   Eyes:      General: No scleral icterus.  Cardiovascular:      Rate and Rhythm: Regular rhythm. Bradycardia present.      Heart sounds: Murmur heard.   Pulmonary:      Effort: Pulmonary effort is normal.      Breath sounds: Normal breath sounds.   Abdominal:      Palpations: Abdomen is soft.   Musculoskeletal:         General: No swelling. Normal range of motion.   Skin:     General: Skin is warm and dry.   Neurological:      Mental Status: He is alert and oriented to person, place, and time.   Psychiatric:         Mood and Affect: Mood normal.         Behavior: Behavior normal.         Thought Content: Thought content normal.         Judgment: Judgment normal.            Opioid screening has been done- patient denies taking opioid medication. Refer to the rooming section for pain assessment for today's visit.  Review for substance use disorder screen-  patient denies using substance  medication.        Diagnoses and health risks identified today and associated recommendations/orders:    1. Prostate cancer  Stable followed by urology    2. Hypercholesteremia  Stable followed by PCP- 1/12/2022 CHOL 206 Trig 122  HDL  52  .6  - Ambulatory referral/consult to Cardiology; Future    3. Bradycardia  Stable followed by PCP-asymptomatic  - Ambulatory referral/consult to Cardiology; Future    4. Encounter for preventive health examination  Here for Health Risk Assessment/Annual Wellness Visit.  Health maintenance reviewed and updated. Follow up in one year.          Provided Nilesh with a 5-10 year written screening schedule and personal prevention plan. Recommendations were developed using the USPSTF age appropriate recommendations. Education, counseling, and referrals were provided as needed. After Visit Summary printed and given to patient which includes a list of additional screenings\tests needed. Cognitive function clock drawing has been labeled with the patient's identification  & forwarded to medical records to be scanned into the patient's epic chart.Colonoscopy due- has annual PCP in 2 months-pt preference -deferred to PCP to order. Completed Atherosclerotic Cardiovascular  Disease 2013 Risk calculator from AHA/ACC for estimation on risk for a  cardiovascular  event ( coronary or stroke death or  nonfatal MI or stroke) in the next 10 years= 17.4%- lipid panel reviewed- mediterranean diet, exercise. Pt commented that he noticed his pulse rate baseline in the 50 's (asymptomatic) & described history of SA node dysfunction at [facility] in distant past- referral to cardiology for further evaluation. Requesting annual labs-message given to PCP nurse to schedule.    Follow-up in 1 year WILIAN.    RICKY Messina offered to discuss advanced care planning, including how to pick a person who would make decisions for you if you were unable to make them for yourself, called a health care power of  , and what kind of decisions you might make such as use of life sustaining treatments such as ventilators and tube feeding when faced with a life limiting illness recorded on a living will that they will need to know. (How you want to be cared for as you near the end of your natural life)     X Patient is interested in learning more about how to make advanced directives.  I provided them paperwork and offered to discuss this with them.

## 2022-12-15 NOTE — PATIENT INSTRUCTIONS
Counseling and Referral of Other Preventative  (Italic type indicates deductible and co-insurance are waived)    Patient Name: Nilesh Almaraz  Today's Date: 12/15/2022    Health Maintenance         Date Due Completion Date    COVID-19 Vaccine (4 - Booster for Moderna series) FEB 2023 OR AFTER 11/5/2021    Eye Exam 12/01/2023 12/1/2022 (Done)    Override on 12/1/2022: Done (Dr SORENSEN, & dR SAMUELS SPRING OR WINTER)    Lipid Panel 01/12/2023 1/12/2022    Colorectal Cancer Screening 04/28/2027 4/28/2017 (Done)        TETANUS VACCINE 12/04/2027 12/4/2017      ABNORMAL WHISPER  TEST    COLONOSCOPY- DUE- PLANS TO GET BY PCP    REFERRAL TO CARDIOLOGY-ESTABLISH SELF-- 17.4% RISK- SEE RISK ASSESSMENT          Orders Placed This Encounter   Procedures    Ambulatory referral/consult to Cardiology       The following information is provided to all patients.  This information is to help you find resources for any of the problems found today that may be affecting your health:                Living healthy guide: www.[facility].[place].gov      Understanding Diabetes: www.diabetes.org      Eating healthy: www.cdc.gov/healthyweight      [facility] home safety checklist: www.cdc.gov/steadi/patient.html      Agency on Aging: www.goea.[place].gov      Alcoholics anonymous (AA): www.aa.org      Physical Activity: www.randy.nih.gov/ed8qilj      Tobacco use: www.quitwithusla.org

## 2023-02-07 ENCOUNTER — LAB VISIT (OUTPATIENT)
Dept: LAB | Facility: HOSPITAL | Age: 73
End: 2023-02-07
Attending: INTERNAL MEDICINE
Payer: MEDICARE

## 2023-02-07 DIAGNOSIS — R73.9 ELEVATED BLOOD SUGAR: ICD-10-CM

## 2023-02-07 DIAGNOSIS — E78.00 HYPERCHOLESTEREMIA: ICD-10-CM

## 2023-02-07 LAB
ALBUMIN SERPL BCP-MCNC: 3.6 G/DL (ref 3.5–5.2)
ALP SERPL-CCNC: 66 U/L (ref 55–135)
ALT SERPL W/O P-5'-P-CCNC: 29 U/L (ref 10–44)
ANION GAP SERPL CALC-SCNC: 11 MMOL/L (ref 8–16)
AST SERPL-CCNC: 25 U/L (ref 10–40)
BASOPHILS # BLD AUTO: 0.05 K/UL (ref 0–0.2)
BASOPHILS NFR BLD: 1 % (ref 0–1.9)
BILIRUB SERPL-MCNC: 0.8 MG/DL (ref 0.1–1)
BUN SERPL-MCNC: 15 MG/DL (ref 8–23)
CALCIUM SERPL-MCNC: 9.2 MG/DL (ref 8.7–10.5)
CHLORIDE SERPL-SCNC: 107 MMOL/L (ref 95–110)
CHOLEST SERPL-MCNC: 196 MG/DL (ref 120–199)
CHOLEST/HDLC SERPL: 4.1 {RATIO} (ref 2–5)
CO2 SERPL-SCNC: 23 MMOL/L (ref 23–29)
CREAT SERPL-MCNC: 1 MG/DL (ref 0.5–1.4)
DIFFERENTIAL METHOD: NORMAL
EOSINOPHIL # BLD AUTO: 0.3 K/UL (ref 0–0.5)
EOSINOPHIL NFR BLD: 5.9 % (ref 0–8)
ERYTHROCYTE [DISTWIDTH] IN BLOOD BY AUTOMATED COUNT: 12.3 % (ref 11.5–14.5)
EST. GFR  (NO RACE VARIABLE): >60 ML/MIN/1.73 M^2
ESTIMATED AVG GLUCOSE: 117 MG/DL (ref 68–131)
GLUCOSE SERPL-MCNC: 117 MG/DL (ref 70–110)
HBA1C MFR BLD: 5.7 % (ref 4–5.6)
HCT VFR BLD AUTO: 46.2 % (ref 40–54)
HDLC SERPL-MCNC: 48 MG/DL (ref 40–75)
HDLC SERPL: 24.5 % (ref 20–50)
HGB BLD-MCNC: 14.8 G/DL (ref 14–18)
IMM GRANULOCYTES # BLD AUTO: 0.01 K/UL (ref 0–0.04)
IMM GRANULOCYTES NFR BLD AUTO: 0.2 % (ref 0–0.5)
LDLC SERPL CALC-MCNC: 120.6 MG/DL (ref 63–159)
LYMPHOCYTES # BLD AUTO: 1 K/UL (ref 1–4.8)
LYMPHOCYTES NFR BLD: 18.4 % (ref 18–48)
MCH RBC QN AUTO: 29.9 PG (ref 27–31)
MCHC RBC AUTO-ENTMCNC: 32 G/DL (ref 32–36)
MCV RBC AUTO: 93 FL (ref 82–98)
MONOCYTES # BLD AUTO: 0.5 K/UL (ref 0.3–1)
MONOCYTES NFR BLD: 9 % (ref 4–15)
NEUTROPHILS # BLD AUTO: 3.4 K/UL (ref 1.8–7.7)
NEUTROPHILS NFR BLD: 65.5 % (ref 38–73)
NONHDLC SERPL-MCNC: 148 MG/DL
NRBC BLD-RTO: 0 /100 WBC
PLATELET # BLD AUTO: 180 K/UL (ref 150–450)
PMV BLD AUTO: 11.2 FL (ref 9.2–12.9)
POTASSIUM SERPL-SCNC: 4.2 MMOL/L (ref 3.5–5.1)
PROT SERPL-MCNC: 7.1 G/DL (ref 6–8.4)
RBC # BLD AUTO: 4.95 M/UL (ref 4.6–6.2)
SODIUM SERPL-SCNC: 141 MMOL/L (ref 136–145)
TRIGL SERPL-MCNC: 137 MG/DL (ref 30–150)
TSH SERPL DL<=0.005 MIU/L-ACNC: 1.36 UIU/ML (ref 0.4–4)
WBC # BLD AUTO: 5.23 K/UL (ref 3.9–12.7)

## 2023-02-07 PROCEDURE — 85025 COMPLETE CBC W/AUTO DIFF WBC: CPT | Performed by: INTERNAL MEDICINE

## 2023-02-07 PROCEDURE — 84443 ASSAY THYROID STIM HORMONE: CPT | Performed by: INTERNAL MEDICINE

## 2023-02-07 PROCEDURE — 80053 COMPREHEN METABOLIC PANEL: CPT | Performed by: INTERNAL MEDICINE

## 2023-02-07 PROCEDURE — 83036 HEMOGLOBIN GLYCOSYLATED A1C: CPT | Performed by: INTERNAL MEDICINE

## 2023-02-07 PROCEDURE — 36415 COLL VENOUS BLD VENIPUNCTURE: CPT | Mod: PO | Performed by: INTERNAL MEDICINE

## 2023-02-07 PROCEDURE — 80061 LIPID PANEL: CPT | Performed by: INTERNAL MEDICINE

## 2023-02-08 ENCOUNTER — PATIENT MESSAGE (OUTPATIENT)
Dept: INTERNAL MEDICINE | Facility: CLINIC | Age: 73
End: 2023-02-08
Payer: MEDICARE

## 2023-02-14 ENCOUNTER — OFFICE VISIT (OUTPATIENT)
Dept: INTERNAL MEDICINE | Facility: CLINIC | Age: 73
End: 2023-02-14
Payer: MEDICARE

## 2023-02-14 ENCOUNTER — TELEPHONE (OUTPATIENT)
Dept: ENDOSCOPY | Facility: HOSPITAL | Age: 73
End: 2023-02-14
Payer: MEDICARE

## 2023-02-14 VITALS
BODY MASS INDEX: 24.17 KG/M2 | WEIGHT: 178.44 LBS | RESPIRATION RATE: 19 BRPM | HEIGHT: 72 IN | OXYGEN SATURATION: 100 % | DIASTOLIC BLOOD PRESSURE: 60 MMHG | TEMPERATURE: 96 F | SYSTOLIC BLOOD PRESSURE: 122 MMHG | HEART RATE: 65 BPM

## 2023-02-14 DIAGNOSIS — C61 PROSTATE CANCER: Primary | ICD-10-CM

## 2023-02-14 DIAGNOSIS — K64.9 HEMORRHOIDS, UNSPECIFIED HEMORRHOID TYPE: ICD-10-CM

## 2023-02-14 DIAGNOSIS — Z00.00 ANNUAL PHYSICAL EXAM: ICD-10-CM

## 2023-02-14 DIAGNOSIS — Z12.11 COLON CANCER SCREENING: ICD-10-CM

## 2023-02-14 DIAGNOSIS — M25.512 CHRONIC LEFT SHOULDER PAIN: ICD-10-CM

## 2023-02-14 DIAGNOSIS — G89.29 CHRONIC LEFT SHOULDER PAIN: ICD-10-CM

## 2023-02-14 DIAGNOSIS — F41.9 ANXIETY: ICD-10-CM

## 2023-02-14 PROCEDURE — 99215 PR OFFICE/OUTPT VISIT, EST, LEVL V, 40-54 MIN: ICD-10-PCS | Mod: S$PBB,,, | Performed by: INTERNAL MEDICINE

## 2023-02-14 PROCEDURE — 90677 PCV20 VACCINE IM: CPT | Mod: PBBFAC,PO

## 2023-02-14 PROCEDURE — 99214 OFFICE O/P EST MOD 30 MIN: CPT | Mod: PBBFAC,PO | Performed by: INTERNAL MEDICINE

## 2023-02-14 PROCEDURE — 99999 PR PBB SHADOW E&M-EST. PATIENT-LVL IV: ICD-10-PCS | Mod: PBBFAC,,, | Performed by: INTERNAL MEDICINE

## 2023-02-14 PROCEDURE — 99215 OFFICE O/P EST HI 40 MIN: CPT | Mod: S$PBB,,, | Performed by: INTERNAL MEDICINE

## 2023-02-14 PROCEDURE — 99999 PR PBB SHADOW E&M-EST. PATIENT-LVL IV: CPT | Mod: PBBFAC,,, | Performed by: INTERNAL MEDICINE

## 2023-02-14 RX ORDER — TAMSULOSIN HYDROCHLORIDE 0.4 MG/1
1 CAPSULE ORAL 2 TIMES DAILY
Qty: 180 CAPSULE | Refills: 3 | Status: SHIPPED | OUTPATIENT
Start: 2023-02-14 | End: 2023-10-19 | Stop reason: SDUPTHER

## 2023-02-14 NOTE — PROGRESS NOTES
Subjective:       Patient ID: Nilesh Almaraz is a 72 y.o. male.    Chief Complaint: Annual Exam    HPI  72 y.o. Male here for annual exam.     Vaccines: Influenza (20221); Tetanus (2017); Prevnar 13 (2017); Pneumococcal (2018); Prevnar 20 (2023); Shingrix (2020)  Eye exam: 2018  Colonoscopy: 5/17- repeat in 5 years     Exercise: cardio 2-3x/wk  Diet: regular     Past Medical History:  No date: Allergy  No date: Anxiety  No date: BPH (benign prostatic hypertrophy)  No date: Prostate cancer  No date: Prostatism  No date: Skin cancer  No date: Prediabetes   Past Surgical History:  No date: FRACTURE SURGERY      Comment:  left ankle  No date: HERNIA REPAIR  No date: hydrocele  No date: SKIN LESION EXCISION  Social History    Socioeconomic History      Marital status:       Spouse name: Palmira      Number of children: 2    Occupational History      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iest        Employer: [employer]    Tobacco Use      Smoking status: Never      Smokeless tobacco: Never    Substance and Sexual Activity      Alcohol use: Yes        Alcohol/week: 2.0 - 3.0 standard drinks        Types: 2 - 3 Glasses of wine per week        Comment: daily      Drug use: No      Sexual activity: Yes        Partners: Female    Social Determinants of Health  Financial Resource Strain: Low Risk       Difficulty of Paying Living Expenses: Not hard at all  Food Insecurity: No Food Insecurity      Worried About Running Out of Food in the Last Year: Never true      Ran Out of Food in the Last Year: Never true  Transportation Needs: No Transportation Needs      Lack of Transportation (Medical): No      Lack of Transportation (Non-Medical): No  Physical Activity: Sufficiently Active      Days of Exercise per Week: 5 days      Minutes of Exercise per Session: 60 min  Stress: No Stress Concern Present      Feeling of Stress : Not at all  Social Connections: Unknown      Frequency of Communication with Friends and Family: Twice a week       Frequency of Social Gatherings with Friends and Family: Once a week      Active Member of Clubs or Organizations: Yes      Attends Club or Organization Meetings: More than 4 times per year      Marital Status:   Housing Stability: Low Risk       Unable to Pay for Housing in the Last Year: No      Number of Places Lived in the Last Year: 2      Unstable Housing in the Last Year: No  Review of patient's allergies indicates:   -- Penicillins     --  Other reaction(s): Hives             Patient received rocephin with no complications  Art SHAJI Almaraz had no medications administered during this visit.    Review of Systems   Constitutional:  Negative for activity change, appetite change, chills, diaphoresis, fatigue, fever and unexpected weight change.   HENT:  Positive for rhinorrhea. Negative for nasal congestion, hearing loss, mouth sores, postnasal drip, sinus pressure/congestion, sneezing, sore throat, trouble swallowing and voice change.    Eyes:  Negative for discharge, itching and visual disturbance.   Respiratory:  Negative for cough, chest tightness, shortness of breath and wheezing.    Cardiovascular:  Negative for chest pain, palpitations and leg swelling.   Gastrointestinal:  Positive for diarrhea. Negative for abdominal pain, blood in stool, constipation, nausea and vomiting.   Endocrine: Negative for cold intolerance, heat intolerance, polydipsia and polyuria.   Genitourinary:  Positive for urgency. Negative for difficulty urinating, dysuria, flank pain and hematuria.   Musculoskeletal:  Positive for arthralgias. Negative for back pain, joint swelling, myalgias and neck pain.   Integumentary:  Negative for rash and wound.   Allergic/Immunologic: Negative for environmental allergies and food allergies.   Neurological:  Negative for dizziness, tremors, seizures, syncope, weakness and headaches.   Hematological:  Negative for adenopathy. Does not bruise/bleed easily.   Psychiatric/Behavioral:  Negative  for confusion, dysphoric mood, sleep disturbance and suicidal ideas. The patient is not nervous/anxious.        Objective:      Physical Exam  Constitutional:       General: He is not in acute distress.     Appearance: Normal appearance. He is well-developed. He is not ill-appearing, toxic-appearing or diaphoretic.   HENT:      Head: Normocephalic and atraumatic.      Right Ear: External ear normal.      Left Ear: External ear normal.      Nose: Nose normal.      Mouth/Throat:      Pharynx: No oropharyngeal exudate.   Eyes:      General: No scleral icterus.        Right eye: No discharge.         Left eye: No discharge.      Extraocular Movements: Extraocular movements intact.      Conjunctiva/sclera: Conjunctivae normal.      Pupils: Pupils are equal, round, and reactive to light.   Neck:      Thyroid: No thyromegaly.      Vascular: No JVD.   Cardiovascular:      Rate and Rhythm: Normal rate and regular rhythm.      Pulses: Normal pulses.      Heart sounds: Normal heart sounds. No murmur heard.  Pulmonary:      Effort: Pulmonary effort is normal. No respiratory distress.      Breath sounds: Normal breath sounds. No wheezing or rales.   Abdominal:      General: Bowel sounds are normal. There is no distension.      Palpations: Abdomen is soft.      Tenderness: There is no abdominal tenderness. There is no right CVA tenderness, left CVA tenderness, guarding or rebound.   Musculoskeletal:      Cervical back: Normal range of motion and neck supple. No rigidity.      Right lower leg: No edema.      Left lower leg: No edema.   Lymphadenopathy:      Cervical: No cervical adenopathy.   Skin:     General: Skin is warm and dry.      Capillary Refill: Capillary refill takes less than 2 seconds.      Coloration: Skin is not pale.      Findings: No rash.   Neurological:      General: No focal deficit present.      Mental Status: He is alert and oriented to person, place, and time. Mental status is at baseline.      Cranial  Nerves: No cranial nerve deficit.      Sensory: No sensory deficit.      Motor: No weakness.      Coordination: Coordination normal.      Gait: Gait normal.      Deep Tendon Reflexes: Reflexes normal.   Psychiatric:         Mood and Affect: Mood normal.         Behavior: Behavior normal.         Thought Content: Thought content normal.         Judgment: Judgment normal.       Assessment:       Problem List Items Addressed This Visit          Oncology    Prostate cancer - Primary       GI    Hemorrhoids     Other Visit Diagnoses       Anxiety        Colon cancer screening        Relevant Orders    Ambulatory referral/consult to Endo Procedure     Annual physical exam        Relevant Orders    (In Office Administered) Pneumococcal Conjugate Vaccine (20 Valent) (IM)    Chronic left shoulder pain        Relevant Orders    Ambulatory referral/consult to Physical/Occupational Therapy            Plan:    Hemorrhoids- stable      Anxiety- controlled off Celexa       Prostate cancer- s/p XRT      Prediabetes      Chronic left shoulder pain- referral to PT     F/u in 1 yr     Over 1/2 of 40 minute visit spent reviewing pt's medical records, education/discussion of pt's medical conditions and medical management

## 2023-02-14 NOTE — TELEPHONE ENCOUNTER
Patient calling to schedule procedure. Patient declined first available PAT appointment   Stayed he is only in town March and November

## 2023-02-16 ENCOUNTER — CLINICAL SUPPORT (OUTPATIENT)
Dept: REHABILITATION | Facility: HOSPITAL | Age: 73
End: 2023-02-16
Attending: INTERNAL MEDICINE
Payer: MEDICARE

## 2023-02-16 DIAGNOSIS — M25.512 CHRONIC LEFT SHOULDER PAIN: ICD-10-CM

## 2023-02-16 DIAGNOSIS — G89.29 CHRONIC LEFT SHOULDER PAIN: ICD-10-CM

## 2023-02-16 PROCEDURE — 97110 THERAPEUTIC EXERCISES: CPT | Mod: PO,97

## 2023-02-16 PROCEDURE — 97161 PT EVAL LOW COMPLEX 20 MIN: CPT | Mod: PO
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
## 2023-02-20 ENCOUNTER — TELEPHONE (OUTPATIENT)
Dept: ENDOSCOPY | Facility: HOSPITAL | Age: 73
End: 2023-02-20
Payer: MEDICARE

## 2023-02-20 DIAGNOSIS — Z12.11 SPECIAL SCREENING FOR MALIGNANT NEOPLASMS, COLON: Primary | ICD-10-CM

## 2023-02-20 RX ORDER — SODIUM, POTASSIUM,MAG SULFATES 17.5-3.13G
1 SOLUTION, RECONSTITUTED, ORAL ORAL DAILY
Qty: 1 KIT | Refills: 0 | Status: SHIPPED | OUTPATIENT
Start: 2023-02-20 | End: 2023-02-22

## 2023-02-27 NOTE — PROGRESS NOTES
"[facility]   Physical Therapy Treatment Note     Name: Nilesh Almaraz  [facility] Number: [id number]    Therapy Diagnosis:   Encounter Diagnosis   Name Primary?    Chronic left shoulder pain Yes     Physician: Ke Aguero, *    Visit Date: 2/28/2023    Physician Orders: PT Eval and Treat  Medical Diagnosis from Referral: M25.512,G89.29 (ICD-10-CM) - Chronic left shoulder pain  Evaluation Date: 2/16/2023  Authorization Period Expiration: 12/31/23  Plan of Care Expiration: 4/30/23  Progress Note Due: 3/16/23  Visit # / Visits authorized: 1/ 40  FOTO: 1/3     Precautions: Standard      Time In: 8:15 AM  Time Out: 8:57 AM  Total Appointment Time (timed & untimed codes): 38 minutes one on one with PT (TE-3)    SUBJECTIVE     Pt reports: he has use of the left shoulder but when he gets to a certain point with the range of motion the pain will start. No changes since eval. He has also developed plantar fasciitis in the let foot  He was compliant with home exercise program.  Response to previous treatment: ernestine eval well  Functional change: none    Pain: 0/10 (at rest), 7-8/10 reaching behind the back, behind, and overhead  Location: left shoulder    OBJECTIVE     Objective Measures updated at progress report unless specified.     Treatment     Art received the treatments listed below:      therapeutic exercises to develop strength, endurance, ROM, and posture for 42 minutes including:      **Patient was one on one with physical therapist for 38 minutes**      Supine dowel flexion 2x10 3" holds with 4# dowel  +serratus punches supine 3x10 4# dowel  +prone Y's 2x10 L  +prone T's 2x10 L  +prone I's 2x10 L  Wall slides abduction and flexion 3x10 with 3" holds each   Shoulder trio 2# 3x10 each  +UBE 3f/3b        Patient Education and Home Exercises     Home Exercises Provided and Patient Education Provided     Education provided:   - pt educated on all interventions performed today  - " HEP    Written Home Exercises Provided: yes. Exercises were reviewed and Art was able to demonstrate them prior to the end of the session.  Art demonstrated good  understanding of the education provided. See EMR under Patient Instructions for exercises provided during therapy sessions    ASSESSMENT     Pt with good tolerance to first session after PT initial eval. Session focused on shoulder and periscapular strengthening. Initiated prone periscapular strengthening therex (prone Y, T, I's) and pt required min cuing for scapular control/positioning. Progress as ernestine to improve shoulder mobility and strength.     Art Is progressing well towards his goals.   Pt prognosis is Excellent.     Pt will continue to benefit from skilled outpatient physical therapy to address the deficits listed in the problem list box on initial evaluation, provide pt/family education and to maximize pt's level of independence in the home and community environment.     Pt's spiritual, cultural and educational needs considered and pt agreeable to plan of care and goals.     Anticipated barriers to physical therapy: leaving the state at end of March     GOALS: PROGRESSING    Short Term Goals:  2 weeks  1.Report decreased shoulder pain < / =  5/10 at worst  to increase tolerance for overhead lifting.   2. Increase PROM by 10 degrees where limited.   3. Increased strength by 1/3 MMT grade in areas of limitation to increase tolerance for ADL and work activities.  4. Pt to tolerate HEP to improve ROM and independence with ADL's     Long Term Goals: 5 weeks  1.Report decreased shoulder pain  < / =  2 /10 at worst to increase tolerance for overhead lifting  2.Increase AROM to within 5 degrees of contralateral flexion.   3.Increase strength to >/= 4/5 in areas of limitation to increase tolerance for ADL and work activities.  4. Pt goal: able to reach high cabinet with no pain   5. Pt will demonstrate 100% recall of HEP with no assistance from therapist.      PLAN     Continue PT POC    Jaqueline Jacob PT

## 2023-02-28 ENCOUNTER — CLINICAL SUPPORT (OUTPATIENT)
Dept: REHABILITATION | Facility: HOSPITAL | Age: 73
End: 2023-02-28
Attending: INTERNAL MEDICINE
Payer: MEDICARE

## 2023-02-28 DIAGNOSIS — G89.29 CHRONIC LEFT SHOULDER PAIN: Primary | ICD-10-CM

## 2023-02-28 DIAGNOSIS — M25.512 CHRONIC LEFT SHOULDER PAIN: Primary | ICD-10-CM

## 2023-02-28 PROCEDURE — 97110 THERAPEUTIC EXERCISES: CPT | Mod: PO,97

## 2023-03-02 NOTE — PROGRESS NOTES
"[facility]   Physical Therapy Treatment Note     Name: Nilesh Almaraz  [facility] Number: [id number]    Therapy Diagnosis:   Encounter Diagnosis   Name Primary?    Chronic left shoulder pain Yes       Physician: Ke Aguero, *    Visit Date: 3/3/2023    Physician Orders: PT Eval and Treat  Medical Diagnosis from Referral: M25.512,G89.29 (ICD-10-CM) - Chronic left shoulder pain  Evaluation Date: 2/16/2023  Authorization Period Expiration: 12/31/23  Plan of Care Expiration: 4/30/23  Progress Note Due: 3/16/23  Visit # / Visits authorized: 2/ 40  FOTO: 1/3     Precautions: Standard      Time In: 8:38 AM  Time Out: 9:16 AM  Total Appointment Time (timed & untimed codes): 38 minutes (TE-3)    SUBJECTIVE     Pt reports: no new complaints, but feels the ROM is getting a little better  He was compliant with home exercise program.  Response to previous treatment: no adverse effects   Functional change: improved ROM    Pain: 0/10 (at rest), 7/10 reaching behind the back, behind, and overhead  Location: left shoulder    OBJECTIVE     Objective Measures updated at progress report unless specified.     Treatment     Art received the treatments listed below:      therapeutic exercises to develop strength, endurance, ROM, and posture for 38 minutes including:      Supine dowel flexion 3x10 3" holds with 4# dowel  serratus punches supine 3x10 4# dowel  prone Y's 2x10 (level 1) bilaterally   prone T's 2x10 bilaterally   prone retraction and extension 2x10 bilaterally   Wall slides abduction and flexion 3x10 with 3" holds each   +standing rows 3x10 GTB  +standing shoulder extensions 3x10 GTB  Shoulder trio 2# 3x10 each- not performed today  UBE 3f/3b +level 1.5        Patient Education and Home Exercises     Home Exercises Provided and Patient Education Provided     Education provided:   - pt educated on all interventions performed today  - HEP    Written Home Exercises Provided: yes. Exercises were " reviewed and Art was able to demonstrate them prior to the end of the session.  Art demonstrated good  understanding of the education provided. See EMR under Patient Instructions for exercises provided during therapy sessions    ASSESSMENT     Continued with shoulder/periscapular strength. Added theraband rows and shoulder extensions; pt required min verbal/tactile cuing for scapular control/positioning with theraband exercises. Otherwise, good carryover of therex. Progress as ernestine to improve shoulder mobility and strength.     Art Is progressing well towards his goals.   Pt prognosis is Excellent.     Pt will continue to benefit from skilled outpatient physical therapy to address the deficits listed in the problem list box on initial evaluation, provide pt/family education and to maximize pt's level of independence in the home and community environment.     Pt's spiritual, cultural and educational needs considered and pt agreeable to plan of care and goals.     Anticipated barriers to physical therapy: leaving the state at end of March     GOALS: PROGRESSING    Short Term Goals:  2 weeks  1.Report decreased shoulder pain < / =  5/10 at worst  to increase tolerance for overhead lifting.   2. Increase PROM by 10 degrees where limited.   3. Increased strength by 1/3 MMT grade in areas of limitation to increase tolerance for ADL and work activities.  4. Pt to tolerate HEP to improve ROM and independence with ADL's     Long Term Goals: 5 weeks  1.Report decreased shoulder pain  < / =  2 /10 at worst to increase tolerance for overhead lifting  2.Increase AROM to within 5 degrees of contralateral flexion.   3.Increase strength to >/= 4/5 in areas of limitation to increase tolerance for ADL and work activities.  4. Pt goal: able to reach high cabinet with no pain   5. Pt will demonstrate 100% recall of HEP with no assistance from therapist.     PLAN     Continue PT POC    Jaqueline Jacob, PT

## 2023-03-03 ENCOUNTER — LAB VISIT (OUTPATIENT)
Dept: LAB | Facility: HOSPITAL | Age: 73
End: 2023-03-03
Attending: UROLOGY
Payer: MEDICARE

## 2023-03-03 ENCOUNTER — CLINICAL SUPPORT (OUTPATIENT)
Dept: REHABILITATION | Facility: HOSPITAL | Age: 73
End: 2023-03-03
Attending: INTERNAL MEDICINE
Payer: MEDICARE

## 2023-03-03 DIAGNOSIS — M25.512 CHRONIC LEFT SHOULDER PAIN: Primary | ICD-10-CM

## 2023-03-03 DIAGNOSIS — C61 PROSTATE CANCER: ICD-10-CM

## 2023-03-03 DIAGNOSIS — G89.29 CHRONIC LEFT SHOULDER PAIN: Primary | ICD-10-CM

## 2023-03-03 LAB — COMPLEXED PSA SERPL-MCNC: 2.9 NG/ML (ref 0–4)

## 2023-03-03 PROCEDURE — 97110 THERAPEUTIC EXERCISES: CPT | Mod: PO

## 2023-03-03 PROCEDURE — 84153 ASSAY OF PSA TOTAL: CPT | Performed by: UROLOGY

## 2023-03-03 PROCEDURE — 36415 COLL VENOUS BLD VENIPUNCTURE: CPT | Mod: PO | Performed by: UROLOGY

## 2023-03-07 ENCOUNTER — CLINICAL SUPPORT (OUTPATIENT)
Dept: REHABILITATION | Facility: HOSPITAL | Age: 73
End: 2023-03-07
Attending: INTERNAL MEDICINE
Payer: MEDICARE

## 2023-03-07 ENCOUNTER — PATIENT MESSAGE (OUTPATIENT)
Dept: ORTHOPEDICS | Facility: CLINIC | Age: 73
End: 2023-03-07
Payer: MEDICARE

## 2023-03-07 ENCOUNTER — OFFICE VISIT (OUTPATIENT)
Dept: UROLOGY | Facility: CLINIC | Age: 73
End: 2023-03-07
Payer: MEDICARE

## 2023-03-07 VITALS
DIASTOLIC BLOOD PRESSURE: 73 MMHG | HEART RATE: 77 BPM | BODY MASS INDEX: 24.2 KG/M2 | SYSTOLIC BLOOD PRESSURE: 115 MMHG | HEIGHT: 72 IN | WEIGHT: 178.63 LBS

## 2023-03-07 DIAGNOSIS — G89.29 CHRONIC LEFT SHOULDER PAIN: Primary | ICD-10-CM

## 2023-03-07 DIAGNOSIS — C61 PROSTATE CANCER: Primary | ICD-10-CM

## 2023-03-07 DIAGNOSIS — M25.512 CHRONIC LEFT SHOULDER PAIN: Primary | ICD-10-CM

## 2023-03-07 DIAGNOSIS — M79.645 FINGER PAIN, LEFT: Primary | ICD-10-CM

## 2023-03-07 PROCEDURE — 99999 PR PBB SHADOW E&M-EST. PATIENT-LVL III: ICD-10-PCS | Mod: PBBFAC,,, | Performed by: UROLOGY

## 2023-03-07 PROCEDURE — 99213 OFFICE O/P EST LOW 20 MIN: CPT | Mod: PBBFAC | Performed by: UROLOGY

## 2023-03-07 PROCEDURE — 97112 NEUROMUSCULAR REEDUCATION: CPT | Mod: PO,CQ

## 2023-03-07 PROCEDURE — 99214 OFFICE O/P EST MOD 30 MIN: CPT | Mod: S$PBB,,, | Performed by: UROLOGY

## 2023-03-07 PROCEDURE — 97110 THERAPEUTIC EXERCISES: CPT | Mod: PO,CQ

## 2023-03-07 PROCEDURE — 99999 PR PBB SHADOW E&M-EST. PATIENT-LVL III: CPT | Mod: PBBFAC,,, | Performed by: UROLOGY

## 2023-03-07 PROCEDURE — 99214 PR OFFICE/OUTPT VISIT, EST, LEVL IV, 30-39 MIN: ICD-10-PCS | Mod: S$PBB,,, | Performed by: UROLOGY

## 2023-03-07 RX ORDER — IBUPROFEN 800 MG/1
800 TABLET ORAL EVERY 6 HOURS PRN
COMMUNITY
End: 2024-01-30

## 2023-03-07 NOTE — PROGRESS NOTES
"[facility]   Physical Therapy Treatment Note     Name: Nilesh Almaraz  [facility] Number: [id number]    Therapy Diagnosis:   Encounter Diagnosis   Name Primary?    Chronic left shoulder pain Yes       Physician: Ke Aguero, *    Visit Date: 3/7/2023    Physician Orders: PT Eval and Treat  Medical Diagnosis from Referral: M25.512,G89.29 (ICD-10-CM) - Chronic left shoulder pain  Evaluation Date: 2/16/2023  Authorization Period Expiration: 12/31/23  Plan of Care Expiration: 4/30/23  Progress Note Due: 3/16/23  Visit # / Visits authorized: 3/40  FOTO: 1/3     Precautions: Standard      Time In: 8:15 AM  Time Out: 8:58 AM  Total Appointment Time (timed & untimed codes): 43 minutes    SUBJECTIVE     Pt reports: "I got a deep tissue massage yesterday, and they focused mainly on the shoulder."  He was compliant with home exercise program.  Response to previous treatment: good  Functional change: improved ROM    Pain: 0/10 (at rest), 5-6/10   Location: left shoulder    OBJECTIVE     Objective Measures updated at progress report unless specified.     Treatment     Art received the treatments listed below:      therapeutic exercises to develop strength, endurance, ROM, and posture for 28 minutes including:    UBE 3f/3b +level 1.5  Wall slides abduction and flexion 2x10 with 3" holds each   Supine dowel flexion 3x10 3" holds with 4# dowel  Serratus punches supine 3x10 4# dowel  +Supine shoulder horizontal ABD YTB 3x10   +Bilateral shoulder ER YTB 2x10   +[place] 2# 3x10   Standing rows 3x10 GTB  Standing shoulder extensions 3x10 GTB  Shoulder trio 2# 3x10 each- not performed today    Pt received neuromuscular re-education in order to improve scapulohumeral rhythm for 15 mins including:    prone Y's 2x10 (level 1) bilaterally   prone T's 2x10 bilaterally   prone retraction and extension 2x10 bilaterally     Patient Education and Home Exercises     Home Exercises Provided and Patient " Education Provided     Education provided:   - pt educated on all interventions performed today  - HEP    Written Home Exercises Provided: yes. Exercises were reviewed and Art was able to demonstrate them prior to the end of the session.  Art demonstrated good  understanding of the education provided. See EMR under Patient Instructions for exercises provided during therapy sessions    ASSESSMENT     Nilesh was able to properly perform reaching activities with various weights, simulating home environment tasks. Continued progression of weight is required for safe handling of household items.Pt reported fatigue in his shoulder following todays session, but did not experience increased pain with the activity.    Art Is progressing well towards his goals.   Pt prognosis is Excellent.     Pt will continue to benefit from skilled outpatient physical therapy to address the deficits listed in the problem list box on initial evaluation, provide pt/family education and to maximize pt's level of independence in the home and community environment.     Pt's spiritual, cultural and educational needs considered and pt agreeable to plan of care and goals.     Anticipated barriers to physical therapy: leaving the state at end of March     GOALS: PROGRESSING    Short Term Goals:  2 weeks  1.Report decreased shoulder pain < / =  5/10 at worst  to increase tolerance for overhead lifting.   2. Increase PROM by 10 degrees where limited.   3. Increased strength by 1/3 MMT grade in areas of limitation to increase tolerance for ADL and work activities.  4. Pt to tolerate HEP to improve ROM and independence with ADL's     Long Term Goals: 5 weeks  1.Report decreased shoulder pain  < / =  2 /10 at worst to increase tolerance for overhead lifting  2.Increase AROM to within 5 degrees of contralateral flexion.   3.Increase strength to >/= 4/5 in areas of limitation to increase tolerance for ADL and work activities.  4. Pt goal: able to reach  high cabinet with no pain   5. Pt will demonstrate 100% recall of HEP with no assistance from therapist.     PLAN     Continue PT POC    Lynne Gillsepie, PTA

## 2023-03-07 NOTE — PROGRESS NOTES
Clinic Note  3/7/2023      Subjective:         Chief Complaint:   HPI  Nilesh Almaraz is a 72 y.o. male with prostate     cancer. Consult from Dr. Aguero.  Dr. Mcdonough did biopsy 2/9/2012- B9 path with acute and chronic inflammation (PSA 3.5).  Negative family history. 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 Has place in [place] since [place].  On flomax for BPH.  Underwent XRT in [place], [place] 07/2022.   PSA decreased since treatment.      Of note, the patient opted out of any androgen deprivation therapy around his external beam radiation.  He also reports opting for fewer treatments in higher dosages which is not what his radiation oncologist suggested.  However, on further discussion it sounds like this just sounds like hypofractionation.  He reports getting the same cumulative dose of radiation.        FH -negative  PSA - 8.4  Stage - T1C  Volume - 63 ccs  MRI - 3/16/2022- 1.4 cm LBPZ PI-RADS 3  Biopsy - 4/12/2022- Krystian 4+3 (GG3) right base 2/2 17%.80% Krystian 4. 2/14 +/   MARY ELLEN Score - 5 intermediate  NCCN - unfavorable intermediate  Germline testing -not indicated  Somatic testing - Prolaris score 2.8 ( 4.6 % METS rate at 10 years with AS, 3.1 % mets rate with single therapy at 10 years.     3/7/2023- PSA now 2.9 (3.1).     Still spending half his time in [place].   Erections: Very strong  LUTS: some mild urgency. Rare leakage. Colonoscopy next week.    Lab Results   Component Value Date    PSA 7.0 (H) 11/08/2018    PSA 4.7 (H) 05/22/2017    PSA 6.8 (H) 03/29/2016    PSA 5.0 (H) 10/16/2013    PSA 3.52 09/27/2012    PSA 3.4 08/04/2011    PSA 4.5 (H) 07/20/2011    PSADIAG 2.9 03/03/2023    PSADIAG 3.1 11/11/2022    PSADIAG 8.4 (H) 01/12/2022    PSADIAG 4.3 (H) 10/03/2016    PSATOTAL 3.5 08/04/2011    PSAFREE 0.83 08/04/2011    PSAFREEPCT 23.71 08/04/2011      Past Medical History:   Diagnosis Date    Allergy     Anxiety     BPH (benign prostatic hypertrophy)     Prediabetes      Prostate cancer     Prostatism     Skin cancer      Family History   Problem Relation Age of Onset    Diabetes Mother     Cancer Brother         osteo sarcoma    Colon polyps Neg Hx     Heart disease Neg Hx     Amblyopia Neg Hx     Blindness Neg Hx     Cataracts Neg Hx     Glaucoma Neg Hx     Hypertension Neg Hx     Macular degeneration Neg Hx     Retinal detachment Neg Hx     Strabismus Neg Hx     Stroke Neg Hx     Thyroid disease Neg Hx      Social History     Socioeconomic History    Marital status:      Spouse name: Palmira    Number of children: 2   Occupational History    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iest     Employer: [employer]   Tobacco Use    Smoking status: Never    Smokeless tobacco: Never   Substance and Sexual Activity    Alcohol use: Yes     Alcohol/week: 2.0 - 3.0 standard drinks     Types: 2 - 3 Glasses of wine per week     Comment: daily    Drug use: No    Sexual activity: Yes     Partners: Female     Social Determinants of Health     Financial Resource Strain: Low Risk     Difficulty of Paying Living Expenses: Not hard at all   Food Insecurity: No Food Insecurity    Worried About Running Out of Food in the Last Year: Never true    Ran Out of Food in the Last Year: Never true   Transportation Needs: No Transportation Needs    Lack of Transportation (Medical): No    Lack of Transportation (Non-Medical): No   Physical Activity: Sufficiently Active    Days of Exercise per Week: 5 days    Minutes of Exercise per Session: 60 min   Stress: No Stress Concern Present    Feeling of Stress : Not at all   Social Connections: Unknown    Frequency of Communication with Friends and Family: Twice a week    Frequency of Social Gatherings with Friends and Family: Once a week    Active Member of Clubs or Organizations: Yes    Attends Club or Organization Meetings: More than 4 times per year    Marital Status:    Housing Stability: Low Risk     Unable to Pay for Housing in the Last Year: No    Number of Places  Lived in the Last Year: 2    Unstable Housing in the Last Year: No     Past Surgical History:   Procedure Laterality Date    FRACTURE SURGERY      left ankle    HERNIA REPAIR      hydrocele      SKIN LESION EXCISION       Patient Active Problem List   Diagnosis    Prostate cancer    Hypercholesteremia    Bradycardia    Encounter for preventive health examination    History of COVID-19 in Oct 2022    Hemorrhoids    Chronic left shoulder pain     Review of Systems   Constitutional:  Negative for activity change, chills and fever.   Eyes:  Negative for pain.   Respiratory:  Negative for chest tightness and shortness of breath.    Cardiovascular:  Negative for chest pain.   Gastrointestinal:  Negative for abdominal pain.   Genitourinary:  Negative for difficulty urinating, flank pain and testicular pain.   Musculoskeletal:  Negative for back pain.   Neurological:  Negative for dizziness.   Hematological:  Negative for adenopathy.   Psychiatric/Behavioral:  Negative for agitation.        Objective:      /73 (BP Location: Right arm, Patient Position: Sitting)   Pulse 77   Ht 6' (1.829 m)   Wt 81 kg (178 lb 9.6 oz)   BMI 24.22 kg/m²   Estimated body mass index is 24.22 kg/m² as calculated from the following:    Height as of this encounter: 6' (1.829 m).    Weight as of this encounter: 81 kg (178 lb 9.6 oz).  Physical Exam  HENT:      Head: Atraumatic.   Cardiovascular:      Rate and Rhythm: Normal rate.   Pulmonary:      Effort: Pulmonary effort is normal. No respiratory distress.   Abdominal:      Palpations: Abdomen is soft.   Musculoskeletal:         General: Normal range of motion.      Cervical back: Neck supple.   Skin:     General: Skin is warm and dry.   Neurological:      Mental Status: He is alert and oriented to person, place, and time.         Assessment and Plan:           Problem List Items Addressed This Visit          Oncology    Prostate cancer - Primary       Follow up:   Not interested in ADT.    PSA 6 months.       Mara Wilburn

## 2023-03-08 ENCOUNTER — HOSPITAL ENCOUNTER (OUTPATIENT)
Dept: RADIOLOGY | Facility: HOSPITAL | Age: 73
Discharge: HOME | End: 2023-03-08
Attending: ORTHOPAEDIC SURGERY
Payer: MEDICARE

## 2023-03-08 ENCOUNTER — OFFICE VISIT (OUTPATIENT)
Dept: ORTHOPEDICS | Facility: CLINIC | Age: 73
End: 2023-03-08
Payer: MEDICARE

## 2023-03-08 DIAGNOSIS — G89.29 CHRONIC THUMB PAIN, LEFT: ICD-10-CM

## 2023-03-08 DIAGNOSIS — M79.645 FINGER PAIN, LEFT: ICD-10-CM

## 2023-03-08 DIAGNOSIS — M79.645 CHRONIC THUMB PAIN, LEFT: ICD-10-CM

## 2023-03-08 DIAGNOSIS — M18.12 PRIMARY OSTEOARTHRITIS OF FIRST CARPOMETACARPAL JOINT OF LEFT HAND: Primary | ICD-10-CM

## 2023-03-08 DIAGNOSIS — M72.0 DUPUYTREN'S DISEASE OF PALM OF LEFT HAND: ICD-10-CM

## 2023-03-08 PROCEDURE — 99214 PR OFFICE/OUTPT VISIT, EST, LEVL IV, 30-39 MIN: ICD-10-PCS | Mod: S$PBB,25,, | Performed by: ORTHOPAEDIC SURGERY

## 2023-03-08 PROCEDURE — 73140 XR FINGER 2 OR MORE VIEWS LEFT: ICD-10-PCS | Mod: 26,LT,, | Performed by: RADIOLOGY

## 2023-03-08 PROCEDURE — 99214 OFFICE O/P EST MOD 30 MIN: CPT | Mod: S$PBB,25,, | Performed by: ORTHOPAEDIC SURGERY

## 2023-03-08 PROCEDURE — 99212 OFFICE O/P EST SF 10 MIN: CPT | Mod: PBBFAC,PO,25 | Performed by: ORTHOPAEDIC SURGERY

## 2023-03-08 PROCEDURE — 99999 PR PBB SHADOW E&M-EST. PATIENT-LVL II: ICD-10-PCS | Mod: PBBFAC,,, | Performed by: ORTHOPAEDIC SURGERY

## 2023-03-08 PROCEDURE — 99999 PR PBB SHADOW E&M-EST. PATIENT-LVL II: CPT | Mod: PBBFAC,,, | Performed by: ORTHOPAEDIC SURGERY

## 2023-03-08 PROCEDURE — 73140 X-RAY EXAM OF FINGER(S): CPT | Mod: TC,PO,LT

## 2023-03-08 PROCEDURE — 73140 X-RAY EXAM OF FINGER(S): CPT | Mod: 26,LT,, | Performed by: RADIOLOGY

## 2023-03-08 PROCEDURE — 20600 SMALL JOINT ASPIRATION/INJECTION: L THUMB CMC: ICD-10-PCS | Mod: S$PBB,LT,, | Performed by: ORTHOPAEDIC SURGERY

## 2023-03-08 PROCEDURE — 20600 DRAIN/INJ JOINT/BURSA W/O US: CPT | Mod: PBBFAC,PO,LT | Performed by: ORTHOPAEDIC SURGERY

## 2023-03-08 RX ADMIN — METHYLPREDNISOLONE ACETATE 40 MG: 40 INJECTION, SUSPENSION INTRALESIONAL; INTRAMUSCULAR; INTRASYNOVIAL; SOFT TISSUE at 02:03

## 2023-03-08 NOTE — PROGRESS NOTES
Nilesh Almaraz presents for follow up evaluation of   Encounter Diagnoses   Name Primary?    Primary osteoarthritis of first carpometacarpal joint of left hand Yes    Dupuytren's disease of palm of left hand     Chronic thumb pain, left      Patient presents today follow up from left CMCJ injection on 3/14/22. He reports 95% Improvement for 6 months. He denies any numbness/tingling or pain from long finger dupuytren's contracture. He would like a repeat injection today.    PE:    AA&O x 4.  NAD  HEENT:  NCAT, sclera nonicteric  Lungs:  Respirations are equal and unlabored.  CV:  2+ bilateral upper and lower extremity pulses.  MSK:  + grind test left thumb CMC joint, decreased thumb CMC motion. Neurovascularly intact bilaterally.  5/5 thenar and intrinsic musculature strength.  Full range of motion hands, wrists and elbows.    X-rays AP, lateral and oblique left thumb taken today are independently reviewed by me and show left Eaton stage III basilar thumb arthrits.       A/P: left thumb CMC osteoarthritis  1) We have discussed the natural history of basilar thumb arthritis including treatment options such as splinting, oral and topical anti-inflammatories, cortisone injections and surgery.    -I have offered him a selective injection. I have explained the risks, benefits, and alternatives of the procedure in detail.  The patient voices understanding and all questions have been answered. The patient agrees to proceed as planned. So after a sterile prep of the skin in the normal fashion the left thumb CMC joint was injected using a 25 gauge needle with a combination of 1cc 1% plain lidocaine and 40 mg of methylprednisolone.  The patient is cautioned and immediate relief of pain is secondary to the local anesthetic and will be temporary.  After the anesthetic wears off there may be a increase in pain that may last for a few hours or a few days and they should use ice to help alleviate this flair up of pain. Patient  tolerated the procedure well.    - F/U as needed for any worsening of symptoms  - Call with any questions/concerns in the interim           Kellie Joy MD    Please be aware that this note has been generated with the assistance of MModal voice-to-text.  Please excuse any spelling or grammatical errors.

## 2023-03-10 ENCOUNTER — CLINICAL SUPPORT (OUTPATIENT)
Dept: REHABILITATION | Facility: HOSPITAL | Age: 73
End: 2023-03-10
Attending: INTERNAL MEDICINE
Payer: MEDICARE

## 2023-03-10 DIAGNOSIS — G89.29 CHRONIC LEFT SHOULDER PAIN: Primary | ICD-10-CM

## 2023-03-10 DIAGNOSIS — M25.512 CHRONIC LEFT SHOULDER PAIN: Primary | ICD-10-CM

## 2023-03-10 PROCEDURE — 97112 NEUROMUSCULAR REEDUCATION: CPT | Mod: PO

## 2023-03-10 PROCEDURE — 97110 THERAPEUTIC EXERCISES: CPT | Mod: PO

## 2023-03-10 RX ORDER — METHYLPREDNISOLONE ACETATE 40 MG/ML
40 INJECTION, SUSPENSION INTRA-ARTICULAR; INTRALESIONAL; INTRAMUSCULAR; SOFT TISSUE
Status: DISCONTINUED | OUTPATIENT
Start: 2023-03-08 | End: 2023-03-10 | Stop reason: HOSPADM

## 2023-03-10 NOTE — PROGRESS NOTES
"[facility]   Physical Therapy Treatment Note     Name: Nilesh Almaraz  [facility] Number: [id number]    Therapy Diagnosis:   Encounter Diagnosis   Name Primary?    Chronic left shoulder pain Yes     Physician: Ke Aguero, *    Visit Date: 3/10/2023    Physician Orders: PT Eval and Treat  Medical Diagnosis from Referral: M25.512,G89.29 (ICD-10-CM) - Chronic left shoulder pain  Evaluation Date: 2/16/2023  Authorization Period Expiration: 12/31/23  Plan of Care Expiration: 4/30/23  Progress Note Due: 3/16/23  Visit # / Visits authorized: 4/40  FOTO: 1/3     Precautions: Standard      Time In: 9:15 AM  Time Out: 10:00 AM  Total Appointment Time (timed & untimed codes): 15 mins 1 on 1    SUBJECTIVE     Pt reports: still with similar ROM and strength  He was compliant with home exercise program.  Response to previous treatment: good  Functional change: improved ROM    Pain: 0/10 (at rest), 5-6/10   Location: left shoulder    OBJECTIVE     Objective Measures updated at progress report unless specified.     Treatment     Art received the treatments listed below:      therapeutic exercises to develop strength, endurance, ROM, and posture for 30 minutes including:    UBE 3f/3b +level 1.5  Wall slides abduction and flexion 2x10 with 3" holds each   Supine dowel flexion 3x10 3" holds with 4# dowel  Serratus punches supine 3x10 4# dowel  +Supine shoulder horizontal ABD YTB 3x10   +Bilateral shoulder ER YTB 2x10   +Landmines 2# 3x10   Standing rows 3x10 GTB  Standing shoulder extensions 3x10 GTB  Shoulder trio 2# 3x10 each- not performed today    Pt received neuromuscular re-education in order to improve scapulohumeral rhythm for 15 mins including:    prone Y's 2x10 (level 1) bilaterally   prone T's 2x10 bilaterally   prone retraction and extension 2x10 bilaterally     Patient Education and Home Exercises     Home Exercises Provided and Patient Education Provided     Education provided:   - pt " educated on all interventions performed today  - HEP    Written Home Exercises Provided: yes. Exercises were reviewed and Art was able to demonstrate them prior to the end of the session.  Art demonstrated good  understanding of the education provided. See EMR under Patient Instructions for exercises provided during therapy sessions    ASSESSMENT     Nilesh was able to properly perform reaching activities with various weights, simulating home environment tasks. Continued progression of weight is required for safe handling of household items.Pt reported fatigue in his shoulder following todays session, but did not experience increased pain with the activity.    Art Is progressing well towards his goals.   Pt prognosis is Excellent.     Pt will continue to benefit from skilled outpatient physical therapy to address the deficits listed in the problem list box on initial evaluation, provide pt/family education and to maximize pt's level of independence in the home and community environment.     Pt's spiritual, cultural and educational needs considered and pt agreeable to plan of care and goals.     Anticipated barriers to physical therapy: leaving the state at end of March     GOALS: PROGRESSING    Short Term Goals:  2 weeks  1.Report decreased shoulder pain < / =  5/10 at worst  to increase tolerance for overhead lifting.   2. Increase PROM by 10 degrees where limited.   3. Increased strength by 1/3 MMT grade in areas of limitation to increase tolerance for ADL and work activities.  4. Pt to tolerate HEP to improve ROM and independence with ADL's     Long Term Goals: 5 weeks  1.Report decreased shoulder pain  < / =  2 /10 at worst to increase tolerance for overhead lifting  2.Increase AROM to within 5 degrees of contralateral flexion.   3.Increase strength to >/= 4/5 in areas of limitation to increase tolerance for ADL and work activities.  4. Pt goal: able to reach high cabinet with no pain   5. Pt will demonstrate  100% recall of HEP with no assistance from therapist.     PLAN     Continue PT POC    Wan Thomas, PT

## 2023-03-10 NOTE — PROGRESS NOTES
Subjective:      Chief Complaint:   Chief Complaint   Patient presents with    Travel Consult     [facility]            History of Present Illness    Patient is a 67 yo M who presents today for routine pretravel consultation.  The patient reports a past medical history of history of arrythmmia and occassional diarrheal illness, last episode one week ago.  The patient will be traveling to [facility] and [place] on [place].  The patient will be at this destination for 13 days.  Cities: [facility], [facility], [facility].  Planned activities: going through jungle, may do an excursion, travelling on the river.  The patient will [place] lodging [place] [employer] [place] [facility].      The patient reports the following medication allergies; penicillin - hives.  The patient reports the following food allergies; none.  The patient has travelled to the following other countries in the past;  countries, [place] countries, , and [place].  The patient reports that they have all their childhood vaccinations.  Hasn't received hepatitis vaccines.  The patient reports receipt of the following travel related vaccinations: unable to recall.  The purpose of this trip is vacation.    Review of Systems   Constitutional: Negative for chills and fever.   HENT: Negative for congestion and sore throat.    Gastrointestinal: Negative for abdominal pain, diarrhea, nausea and vomiting.   Musculoskeletal: Negative for joint pain and myalgias.       Objective:   Physical Exam   Constitutional: He is oriented to person, place, and time and well-developed, well-nourished, and in no distress.   HENT:   Head: Normocephalic and atraumatic.   Eyes: Conjunctivae and EOM are normal.   Neck: Normal range of motion. Neck supple.   Cardiovascular: Regular rhythm.    Somewhat low heart rate   Pulmonary/Chest: Effort normal and breath sounds normal.   Abdominal: Soft. He exhibits no distension. There is  no tenderness.   Neurological: He is alert and oriented to person, place, and time.   Psychiatric: Affect and judgment normal.      Assessment:     Pre-Travel clinic assessment / Travel Advice Encounter  Immunizations due    Plan:   Patient specific risks:          Destination specific risks:      -Infectious Disease risks:       Mosquito Borne pathogens:  Reviewed basic mosquito avoidance precautions including wearing long sleeve clothing and insect repellant that has 40% DEET.       Food Borne pathogens:  Reviewed basic hand, food and water sanitation precautions.  Patient instructed to take hand  on their trip.  Bottled water only, preferably also while brushing teeth.  Tap water for bathing only.  Fruit that requires peeling is allowable.  Avoid salads since salads are raw.     Blood Borne Pathogens:       STDs:  Risk of STDs reviewed with the patient.       Routine:  Informational handouts provided    -Environmental risks:     Precautions to minimize risk/exposure to crime and motor vehicle accidents were reviewed with the patient.      -Other:  Hep A, typhoid, yellow fever, tdap vaccines today.  Malarone, scopolamine, and azithromycin prescription sent to pharmacy.  Yellow fever paperwork reviewed.  Risk of complications of getting yellow fever vaccination reviewed and they accept the risk.       Biopsy Photograph Reviewed: Yes

## 2023-03-10 NOTE — PROCEDURES
Small Joint Aspiration/Injection: L thumb CMC    Date/Time: 3/8/2023 2:15 PM  Performed by: Kellie Joy MD  Authorized by: Kellie Joy MD     Consent Done?:  Yes (Verbal)  Indications:  Pain  Timeout: prior to procedure the correct patient, procedure, and site was verified    Prep: patient was prepped and draped in usual sterile fashion      Local anesthesia used?: Yes    Anesthesia:  Local infiltration  Local anesthetic:  Lidocaine 1% without epinephrine  Location:  Thumb  Site:  L thumb CMC  Needle size:  25 G  Medications:  40 mg methylPREDNISolone acetate 40 mg/mL  Patient tolerance:  Patient tolerated the procedure well with no immediate complications

## 2023-03-13 NOTE — PROGRESS NOTES
"[facility]   Physical Therapy Treatment Note     Name: Nilesh Almaraz  [facility] Number: [id number]    Therapy Diagnosis:   Encounter Diagnosis   Name Primary?    Chronic left shoulder pain Yes       Physician: Ke Aguero, *    Visit Date: 3/14/2023    Physician Orders: PT Eval and Treat  Medical Diagnosis from Referral: M25.512,G89.29 (ICD-10-CM) - Chronic left shoulder pain  Evaluation Date: 2/16/2023  Authorization Period Expiration: 12/31/23  Plan of Care Expiration: 4/30/23  Progress Note Due: 3/16/23  Visit # / Visits authorized: 5/40  FOTO: 1/3     Precautions: Standard      Time In: 8:18 AM  Time Out: 9:00 AM  Total Appointment Time (timed & untimed codes): 38 minutes one on one with PT (TE-1,NMR-1, TA-1)    SUBJECTIVE     Pt reports: main issue is reaching across the body. Also reaching behind the back is an issue.   He was semi-compliant with home exercise program.  Response to previous treatment: always feels better when leaving here   Functional change: "my overall ROM has gotten better"    Pain: 0/10 (at rest), 5-6/10   Location: left shoulder    OBJECTIVE     Objective Measures updated at progress report unless specified.     Treatment     Art received the treatments listed below:          *PATIENT WAS ONE ON ONE WITH PT FOR 38 MINUTES *    therapeutic exercises to develop strength, endurance, ROM, and posture for 20 minutes including:    UBE 3f/3b +level 1.5  Wall slides abduction and flexion 2x10 with 3" holds each   +sleeper's stretch 10x10"   Supine dowel flexion 3x10 3" holds with 4# dowel  Serratus punches supine 3x10 4# dowel  Supine shoulder horizontal ABD RTB 3x10   +Bilateral shoulder ER YTB 2x10   Landmines 4# 3x10   Standing rows 3x10 GTB  Standing shoulder extensions 3x10 GTB  Shoulder trio 2# 3x10 each- not performed today    Pt received neuromuscular re-education in order to improve scapulohumeral rhythm for 14 mins including:    prone Y's 2x10  " bilaterally   prone T's 2x10 bilaterally   prone retraction and extension 2x10 bilaterally     Therapeutic activities x8 mins  Overhead press standing 5# DB 3x10  Overhead carry 3laps 5# DB     Patient Education and Home Exercises     Home Exercises Provided and Patient Education Provided     Education provided:   - pt educated on all interventions performed today  - HEP compliance     Written Home Exercises Provided: Patient instructed to cont prior HEP. Exercises were reviewed and Art was able to demonstrate them prior to the end of the session.  Art demonstrated good  understanding of the education provided. See EMR under Patient Instructions for exercises provided during therapy sessions    ASSESSMENT     Nilesh was able to properly tolerate all interventions well today with appropriate challenge. Added sleeper's stretch to improve shoulder posterior capsule mobility. Also added overhead press and overhead carry with 5 pound dumbbell to improve overhead strength. Patient is progressing very well with PT POC at this time. Reassess next visit.    Art Is progressing well towards his goals.   Pt prognosis is Excellent.     Pt will continue to benefit from skilled outpatient physical therapy to address the deficits listed in the problem list box on initial evaluation, provide pt/family education and to maximize pt's level of independence in the home and community environment.     Pt's spiritual, cultural and educational needs considered and pt agreeable to plan of care and goals.     Anticipated barriers to physical therapy: leaving the state at end of March     GOALS:     Short Term Goals:  2 weeks  1.Report decreased shoulder pain < / =  5/10 at worst  to increase tolerance for overhead lifting. -MET  2. Increase PROM by 10 degrees where limited.   3. Increased strength by 1/3 MMT grade in areas of limitation to increase tolerance for ADL and work activities.  4. Pt to tolerate HEP to improve ROM and independence  with ADL's-MET     Long Term Goals: 5 weeks  1.Report decreased shoulder pain  < / =  2 /10 at worst to increase tolerance for overhead lifting  2.Increase AROM to within 5 degrees of contralateral flexion.   3.Increase strength to >/= 4/5 in areas of limitation to increase tolerance for ADL and work activities.  4. Pt goal: able to reach high cabinet with no pain   5. Pt will demonstrate 100% recall of HEP with no assistance from therapist. -MET    PLAN     Continue PT POC. Reassessment to be performed next visit.    Jaqueline Jacob, PT

## 2023-03-14 ENCOUNTER — CLINICAL SUPPORT (OUTPATIENT)
Dept: REHABILITATION | Facility: HOSPITAL | Age: 73
End: 2023-03-14
Attending: INTERNAL MEDICINE
Payer: MEDICARE

## 2023-03-14 DIAGNOSIS — M25.512 CHRONIC LEFT SHOULDER PAIN: Primary | ICD-10-CM

## 2023-03-14 DIAGNOSIS — G89.29 CHRONIC LEFT SHOULDER PAIN: Primary | ICD-10-CM

## 2023-03-14 PROCEDURE — 97530 THERAPEUTIC ACTIVITIES: CPT | Mod: PO

## 2023-03-14 PROCEDURE — 97112 NEUROMUSCULAR REEDUCATION: CPT | Mod: PO

## 2023-03-14 PROCEDURE — 97110 THERAPEUTIC EXERCISES: CPT | Mod: PO

## 2023-03-16 NOTE — PROGRESS NOTES
[facility]   Physical Therapy Treatment Note / Progress Note     Name: Nilesh Almaraz  Clinic Number: [id number]    Therapy Diagnosis:   Encounter Diagnosis   Name Primary?    Chronic left shoulder pain Yes         Physician: Ke Aguero, *    Visit Date: 3/17/2023    Physician Orders: PT Eval and Treat  Medical Diagnosis from Referral: M25.512,G89.29 (ICD-10-CM) - Chronic left shoulder pain  Evaluation Date: 2/16/2023  Authorization Period Expiration: 12/31/23  Plan of Care Expiration: 3/24/23  Progress Note Due: 3/24/23  Visit # / Visits authorized: 6/40  FOTO: 2/3     Precautions: Standard      Time In: 8:36 AM  Time Out: 9:30 AM  Total Appointment Time (timed & untimed codes): 54 minutes one on one with PT (TE-3, NMR-1)    SUBJECTIVE     Pt reports: overall better range of motion. Some ranges are still challenging and there is still some overhead weakness.   He was semi-compliant with home exercise program.  Response to previous treatment: always feels better when leaving here   Functional change: improved ROM    Pain: 0/10 (at rest), 4-5/10   Location: left shoulder    OBJECTIVE     Bold=updated measurements from today's reassessment     Passive Range of Motion:   Shoulder Right Left   Flexion  WNL   Abduction  170   ER at 90 WNL WNL WNL   IR WNL WNL WNL      Active Range of Motion:   Shoulder Right Left    Flexion 160 160 130 160   Abduction 135 160 120 150   Reach behind head Opp scap opp scap 3 cm from opp scap    top of scap   Reach behind back  NT NT      Strength:  Shoulder Right Left   Flexion 4+/5 5/5 4/5 pain 5/5   Abduction 4+/5 5/5 4/5 pain 4+/5 (pain 2-3/10)   ER 4+/5 5/5 4+/5 5/5   IR 4+/5 5/5 4+/5 5/5   Middle Trap 3+/5 4/5 3+/5 4-/5 (pain 1/10)   Low Trap 3-/5 4/5 3-/5 4/5 pain           Limitation/Restriction for FOTO shoulder Survey     Therapist reviewed FOTO scores for Nilesh Almaraz on 3/17/23  FOTO documents entered into EP[employer] - see  "Media section.     Limitation Score: 31%              Treatment     Art received the treatments listed below:      Bold=performed         therapeutic exercises to develop strength, endurance, ROM, and posture for 39 minutes including:    Reassessment  FOTO   UBE 3f/3b +level 2  Wall slides abduction and flexion 2x10 with 3" holds each   +sleeper's stretch 10x10"   Supine dowel flexion 3x10 3" holds with 4# dowel  Serratus punches supine 3x10 4# dowel  Supine shoulder horizontal ABD RTB 3x10   +Bilateral shoulder ER YTB 2x10   Landmines 4# 3x10   Standing rows 3x10 GTB  Standing shoulder extensions 3x10 GTB  Shoulder trio 2# 3x10 each- not performed today    Pt received neuromuscular re-education in order to improve scapulohumeral rhythm for 15 mins including:  prone Y's 2x10  bilaterally   prone T's 2x10 bilaterally   prone retraction and extension 2x10 bilaterally     Therapeutic activities x 00 mins- not performed   Overhead press standing 5# DB 3x10  Overhead carry 3laps 5# DB     Patient Education and Home Exercises     Home Exercises Provided and Patient Education Provided     Education provided:   - pt educated on all interventions performed today  - HEP   - POC     Written Home Exercises Provided: yes. Exercises were reviewed and Art was able to demonstrate them prior to the end of the session.  Art demonstrated good  understanding of the education provided. See EMR under Patient Instructions for exercises provided during therapy sessions    ASSESSMENT     Reassessment shows great improvement in left shoulder ROM and shoulder strength since start of care. Patient has made great progress towards goals at this time. He will be returning to [place] at the end of this month and thus will be discharged from PT in 2 visits.     Art Is progressing well towards his goals.   Pt prognosis is Excellent.     Pt will continue to benefit from skilled outpatient physical therapy to address the deficits listed in the " problem list box on initial evaluation, provide pt/family education and to maximize pt's level of independence in the home and community environment.     Pt's spiritual, cultural and educational needs considered and pt agreeable to plan of care and goals.     Anticipated barriers to physical therapy: leaving the state at end of March     GOALS:     Short Term Goals:  2 weeks  1.Report decreased shoulder pain < / =  5/10 at worst  to increase tolerance for overhead lifting. -MET  2. Increase PROM by 10 degrees where limited. -MET  3. Increased strength by 1/3 MMT grade in areas of limitation to increase tolerance for ADL and work activities.-MET  4. Pt to tolerate HEP to improve ROM and independence with ADL's-MET     Long Term Goals: 5 weeks  1.Report decreased shoulder pain  < / =  2 /10 at worst to increase tolerance for overhead lifting  2.Increase AROM to within 5 degrees of contralateral flexion. -MET  3.Increase strength to >/= 4/5 in areas of limitation to increase tolerance for ADL and work activities.-progressing   4. Pt goal: able to reach high cabinet with no pain -almost met   5. Pt will demonstrate 100% recall of HEP with no assistance from therapist. -MET    PLAN   Discharge in 2 visits.     Jaqueline Jacob, PT

## 2023-03-17 ENCOUNTER — CLINICAL SUPPORT (OUTPATIENT)
Dept: REHABILITATION | Facility: HOSPITAL | Age: 73
End: 2023-03-17
Attending: INTERNAL MEDICINE
Payer: MEDICARE

## 2023-03-17 DIAGNOSIS — G89.29 CHRONIC LEFT SHOULDER PAIN: Primary | ICD-10-CM

## 2023-03-17 DIAGNOSIS — M25.512 CHRONIC LEFT SHOULDER PAIN: Primary | ICD-10-CM

## 2023-03-17 PROCEDURE — 97110 THERAPEUTIC EXERCISES: CPT | Mod: PO

## 2023-03-17 PROCEDURE — 97112 NEUROMUSCULAR REEDUCATION: CPT | Mod: PO

## 2023-03-20 PROBLEM — Z00.00 ENCOUNTER FOR PREVENTIVE HEALTH EXAMINATION: Chronic | Status: RESOLVED | Noted: 2022-12-15 | Resolved: 2023-03-20

## 2023-03-21 ENCOUNTER — CLINICAL SUPPORT (OUTPATIENT)
Dept: REHABILITATION | Facility: HOSPITAL | Age: 73
End: 2023-03-21
Attending: INTERNAL MEDICINE
Payer: MEDICARE

## 2023-03-21 DIAGNOSIS — M25.512 CHRONIC LEFT SHOULDER PAIN: Primary | ICD-10-CM

## 2023-03-21 DIAGNOSIS — G89.29 CHRONIC LEFT SHOULDER PAIN: Primary | ICD-10-CM

## 2023-03-21 PROCEDURE — 97530 THERAPEUTIC ACTIVITIES: CPT | Mod: PO,CQ

## 2023-03-21 PROCEDURE — 97110 THERAPEUTIC EXERCISES: CPT | Mod: PO,CQ

## 2023-03-21 PROCEDURE — 97112 NEUROMUSCULAR REEDUCATION: CPT | Mod: PO,CQ

## 2023-03-21 NOTE — PROGRESS NOTES
"[facility]   Physical Therapy Treatment Note / Progress Note     Name: Nilesh Almaraz  Clinic Number: [id number]    Therapy Diagnosis:   No diagnosis found.        Physician: Ke Aguero, *    Visit Date: 3/21/2023    Physician Orders: PT Eval and Treat  Medical Diagnosis from Referral: M25.512,G89.29 (ICD-10-CM) - Chronic left shoulder pain  Evaluation Date: 2/16/2023  Authorization Period Expiration: 12/31/23  Plan of Care Expiration: 3/24/23  Progress Note Due: 3/24/23  Visit # / Visits authorized: 7/40  FOTO: 2/3     Precautions: Standard      Time In: 8:20 AM   Time Out: 8:59 AM  Total Appointment Time (timed & untimed codes): 39 minutes    SUBJECTIVE     Pt reports:   He was compliant with home exercise program.  Response to previous treatment: good  Functional change: lifting item it getting better     Pain: 0/10 (at rest), not rated /10   Location: left shoulder    OBJECTIVE     N/A    Treatment     Art received the treatments listed below:      Bold=performed   therapeutic exercises to develop strength, endurance, ROM, and posture for 15 minutes including:    UBE 3f/3b level 2  Supine dowel flexion 3x10 3" holds with 4# dowel  Serratus punches supine 3x10 4# dowel  Standing rows 3x10 GTB  Standing shoulder extensions 3x10 GTB  Wall slides abduction and flexion 2x10 with 3" holds each   +sleeper's stretch 10x10"   Supine shoulder horizontal ABD RTB 3x10   +Bilateral shoulder ER YTB 2x10   Shoulder trio 2# 3x10 each    Pt received neuromuscular re-education in order to improve scapulohumeral rhythm for 13 mins including:  prone Y's 2x10  bilaterally   prone T's 2x10 bilaterally   prone retraction and extension 2x10 bilaterally     Therapeutic activities 11 mins  Landmines 5# 3x10  Serratus wall slides YTB 3x10  Overhead carry 3 laps 4# DB     Patient Education and Home Exercises     Home Exercises Provided and Patient Education Provided     Education provided:   - pt " educated on all interventions performed today  - HEP   - POC     Written Home Exercises Provided: yes. Exercises were reviewed and Lei was able to demonstrate them prior to the end of the session.  Art demonstrated good  understanding of the education provided. See EMR under Patient Instructions for exercises provided during therapy sessions    ASSESSMENT     Today's treatment continues to focus on improving the proper timing of scapulohumeral with lifting and reaching tasks with varying weights. Pt demonstrated improved motion while performing periscapular exercises, and reported less discomfort. PT He will be returning to [place] at the end of this month and thus will be discharged from PT next visit with HEP.     Art Is progressing well towards his goals.   Pt prognosis is Excellent.     Pt will continue to benefit from skilled outpatient physical therapy to address the deficits listed in the problem list box on initial evaluation, provide pt/family education and to maximize pt's level of independence in the home and community environment.     Pt's spiritual, cultural and educational needs considered and pt agreeable to plan of care and goals.     Anticipated barriers to physical therapy: leaving the state at end of March     GOALS:     Short Term Goals:  2 weeks  1.Report decreased shoulder pain < / =  5/10 at worst  to increase tolerance for overhead lifting. -MET  2. Increase PROM by 10 degrees where limited. -MET  3. Increased strength by 1/3 MMT grade in areas of limitation to increase tolerance for ADL and work activities.-MET  4. Pt to tolerate HEP to improve ROM and independence with ADL's-MET     Long Term Goals: 5 weeks  1.Report decreased shoulder pain  < / =  2 /10 at worst to increase tolerance for overhead lifting  2.Increase AROM to within 5 degrees of contralateral flexion. -MET  3.Increase strength to >/= 4/5 in areas of limitation to increase tolerance for ADL and work activities.-progressing    4. Pt goal: able to reach high cabinet with no pain -almost met   5. Pt will demonstrate 100% recall of HEP with no assistance from therapist. -MET    PLAN   Discharge in 2 visits.     Lynne Gillespie, PTA

## 2023-03-23 ENCOUNTER — ANESTHESIA EVENT (OUTPATIENT)
Dept: ENDOSCOPY | Facility: HOSPITAL | Age: 73
End: 2023-03-23
Payer: MEDICARE

## 2023-03-23 ENCOUNTER — ANESTHESIA (OUTPATIENT)
Dept: ENDOSCOPY | Facility: HOSPITAL | Age: 73
End: 2023-03-23
Payer: MEDICARE

## 2023-03-23 ENCOUNTER — HOSPITAL ENCOUNTER (OUTPATIENT)
Facility: HOSPITAL | Age: 73
Discharge: HOME | End: 2023-03-23
Attending: INTERNAL MEDICINE | Admitting: INTERNAL MEDICINE
Payer: MEDICARE

## 2023-03-23 VITALS
BODY MASS INDEX: 22.75 KG/M2 | HEIGHT: 72 IN | DIASTOLIC BLOOD PRESSURE: 65 MMHG | HEART RATE: 46 BPM | WEIGHT: 168 LBS | TEMPERATURE: 98 F | OXYGEN SATURATION: 98 % | SYSTOLIC BLOOD PRESSURE: 114 MMHG | RESPIRATION RATE: 18 BRPM

## 2023-03-23 DIAGNOSIS — Z12.11 SCREEN FOR COLON CANCER: ICD-10-CM

## 2023-03-23 PROCEDURE — E9220 PRA ENDO ANESTHESIA: HCPCS | Mod: ,,, | Performed by: NURSE ANESTHETIST, CERTIFIED REGISTERED

## 2023-03-23 PROCEDURE — G0121 COLON CA SCRN NOT HI RSK IND: ICD-10-PCS | Mod: ,,, | Performed by: INTERNAL MEDICINE

## 2023-03-23 PROCEDURE — 37000009 HC ANESTHESIA EA ADD 15 MINS: Performed by: INTERNAL MEDICINE

## 2023-03-23 PROCEDURE — 63600175 PHARM REV CODE 636 W HCPCS: Performed by: NURSE ANESTHETIST, CERTIFIED REGISTERED

## 2023-03-23 PROCEDURE — 25000003 PHARM REV CODE 250: Performed by: NURSE ANESTHETIST, CERTIFIED REGISTERED

## 2023-03-23 PROCEDURE — 37000008 HC ANESTHESIA 1ST 15 MINUTES: Performed by: INTERNAL MEDICINE

## 2023-03-23 PROCEDURE — G0121 COLON CA SCRN NOT HI RSK IND: HCPCS | Mod: ,,, | Performed by: INTERNAL MEDICINE

## 2023-03-23 PROCEDURE — 25000003 PHARM REV CODE 250: Performed by: INTERNAL MEDICINE

## 2023-03-23 PROCEDURE — E9220 PRA ENDO ANESTHESIA: ICD-10-PCS | Mod: ,,, | Performed by: NURSE ANESTHETIST, CERTIFIED REGISTERED

## 2023-03-23 PROCEDURE — G0121 COLON CA SCRN NOT HI RSK IND: HCPCS | Performed by: INTERNAL MEDICINE

## 2023-03-23 RX ORDER — LIDOCAINE HYDROCHLORIDE 20 MG/ML
INJECTION INTRAVENOUS
Status: DISCONTINUED | OUTPATIENT
Start: 2023-03-23 | End: 2023-03-23

## 2023-03-23 RX ORDER — SODIUM CHLORIDE 9 MG/ML
INJECTION, SOLUTION INTRAVENOUS CONTINUOUS
Status: DISCONTINUED | OUTPATIENT
Start: 2023-03-23 | End: 2023-03-23 | Stop reason: HOSPADM

## 2023-03-23 RX ORDER — PROPOFOL 10 MG/ML
VIAL (ML) INTRAVENOUS
Status: DISCONTINUED | OUTPATIENT
Start: 2023-03-23 | End: 2023-03-23

## 2023-03-23 RX ORDER — PROPOFOL 10 MG/ML
VIAL (ML) INTRAVENOUS CONTINUOUS PRN
Status: DISCONTINUED | OUTPATIENT
Start: 2023-03-23 | End: 2023-03-23

## 2023-03-23 RX ADMIN — PROPOFOL 30 MG: 10 INJECTION, EMULSION INTRAVENOUS at 08:03

## 2023-03-23 RX ADMIN — SODIUM CHLORIDE: 0.9 INJECTION, SOLUTION INTRAVENOUS at 07:03

## 2023-03-23 RX ADMIN — PROPOFOL 70 MG: 10 INJECTION, EMULSION INTRAVENOUS at 08:03

## 2023-03-23 RX ADMIN — LIDOCAINE HYDROCHLORIDE 50 MG: 20 INJECTION INTRAVENOUS at 08:03

## 2023-03-23 RX ADMIN — PROPOFOL 150 MCG/KG/MIN: 10 INJECTION, EMULSION INTRAVENOUS at 08:03

## 2023-03-23 NOTE — ANESTHESIA POSTPROCEDURE EVALUATION
Anesthesia Post Evaluation    Patient: Nilesh Almaraz    Procedure(s) Performed: Procedure(s) (LRB):  COLONOSCOPY (N/A)    Final Anesthesia Type: general      Patient location during evaluation: PACU  Patient participation: Yes- Able to Participate  Level of consciousness: awake and alert and oriented  Pain management: adequate  Airway patency: patent    PONV status at discharge: No PONV  Anesthetic complications: no      Cardiovascular status: blood pressure returned to baseline and hemodynamically stable  Respiratory status: unassisted  Hydration status: euvolemic  Follow-up not needed.          Vitals Value Taken Time   /65 03/23/23 0927   Temp 36.4 °C (97.5 °F) 03/23/23 0851   Pulse 46 03/23/23 0927   Resp 18 03/23/23 0927   SpO2 98 % 03/23/23 0927         Event Time   Out of Recovery 09:28:06         Pain/Josiah Score: Josiah Score: 10 (3/23/2023  8:53 AM)

## 2023-03-23 NOTE — TRANSFER OF CARE
Anesthesia Transfer of Care Note    Patient: Nilesh Almaraz    Procedure(s) Performed: Procedure(s) (LRB):  COLONOSCOPY (N/A)    Patient location: GI    Anesthesia Type: general    Transport from OR: Transported from OR on room air with adequate spontaneous ventilation    Post pain: adequate analgesia    Post assessment: no apparent anesthetic complications and tolerated procedure well    Post vital signs: stable    Level of consciousness: awake, alert and oriented    Nausea/Vomiting: no nausea/vomiting    Complications: none    Transfer of care protocol was followed      Last vitals:   Visit Vitals  BP (!) 96/55   Pulse (!) 52   Temp 36.7 °C (98.1 °F) (Tympanic)   Resp 16   Ht 6' (1.829 m)   Wt 76.2 kg (168 lb)   SpO2 95%   BMI 22.78 kg/m²

## 2023-03-23 NOTE — ANESTHESIA PREPROCEDURE EVALUATION
03/23/2023  Nilesh Almaraz is a 72 y.o., male.    Active Problem List with Overview Notes    Diagnosis Date Noted    Primary osteoarthritis of first carpometacarpal joint of left hand 03/08/2023    Chronic left shoulder pain 02/16/2023    Hemorrhoids 02/14/2023    Bradycardia 12/15/2022    History of COVID-19 in Oct 2022 12/15/2022    Hypercholesteremia 12/13/2022    Prostate cancer 05/10/2022     Past Surgical History:   Procedure Laterality Date    FRACTURE SURGERY      left ankle    HERNIA REPAIR      hydrocele      SKIN LESION EXCISION         Pre-op Assessment    I have reviewed the Patient Summary Reports.    I have reviewed the NPO Status.   I have reviewed the Medications.     Review of Systems  Anesthesia Hx:  No problems with previous Anesthesia    Hematology/Oncology:  Hematology Normal       -- Cancer in past history:  Oncology Comments: Prostate     EENT/Dental:EENT/Dental Normal   Cardiovascular:  Cardiovascular Normal     Pulmonary:  Pulmonary Normal    Renal/:  Renal/ Normal     Hepatic/GI:  Hepatic/GI Normal    Musculoskeletal:   Arthritis     Neurological:  Neurology Normal    Endocrine:  Endocrine Normal    Dermatological:  Skin Normal    Psych:  Psychiatric Normal           Physical Exam  General: Well nourished, Cooperative, Alert and Oriented    Airway:  Mallampati: II   Mouth Opening: Normal  TM Distance: Normal  Tongue: Normal  Neck ROM: Normal ROM    Dental:  Intact    Chest/Lungs:  Normal Respiratory Rate        Anesthesia Plan  Type of Anesthesia, risks & benefits discussed:    Anesthesia Type: Gen Natural Airway  Intra-op Monitoring Plan: Standard ASA Monitors  Post Op Pain Control Plan: multimodal analgesia  Induction:  IV  Informed Consent: Informed consent signed with the Patient and all parties understand the risks and agree with anesthesia plan.  All  questions answered.   ASA Score: 2  Day of Surgery Review of History & Physical: H&P Update referred to the surgeon/provider.    Ready For Surgery From Anesthesia Perspective.     .

## 2023-03-23 NOTE — H&P
[facility] 4th [facility]  History & Physical    Subjective:      Chief Complaint/Reason for Admission:     Direct access Colonoscopy for screening average risk for CRC by personal and family history today.    Nilesh Almaraz is a 72 y.o. male.    Past Medical History:   Diagnosis Date    Allergy     Anxiety     BPH (benign prostatic hypertrophy)     Prediabetes     Primary osteoarthritis of first carpometacarpal joint of left hand 3/8/2023    Prostate cancer     Prostatism     Skin cancer      Past Surgical History:   Procedure Laterality Date    FRACTURE SURGERY      left ankle    HERNIA REPAIR      hydrocele      SKIN LESION EXCISION       Family History   Problem Relation Age of Onset    Diabetes Mother     Cancer Brother         osteo sarcoma    Colon polyps Neg Hx     Heart disease Neg Hx     Amblyopia Neg Hx     Blindness Neg Hx     Cataracts Neg Hx     Glaucoma Neg Hx     Hypertension Neg Hx     Macular degeneration Neg Hx     Retinal detachment Neg Hx     Strabismus Neg Hx     Stroke Neg Hx     Thyroid disease Neg Hx      Social History     Tobacco Use    Smoking status: Never    Smokeless tobacco: Never   Substance Use Topics    Alcohol use: Yes     Alcohol/week: 2.0 standard drinks     Types: 2 Glasses of wine per week     Comment: daily    Drug use: No       PTA Medications   Medication Sig    B-complex with vitamin C (Z-BEC OR EQUIV) tablet Take 1 tablet by mouth once daily.    psyllium husk 0.4 gram Cap Take by mouth.    tamsulosin (FLOMAX) 0.4 mg Cap Take 1 capsule (0.4 mg total) by mouth 2 (two) times a day.    ibuprofen (ADVIL,MOTRIN) 800 MG tablet Take 800 mg by mouth every 6 (six) hours as needed for Pain.     Review of patient's allergies indicates:   Allergen Reactions    Penicillins      Other reaction(s): Hives  Patient received rocephin with no complications        Review of Systems   Constitutional:  Negative for fever.   Respiratory:  Negative for shortness of breath.     Cardiovascular:  Negative for chest pain.     Objective:      Vital Signs (Most Recent)  Temp: 98.1 °F (36.7 °C) (03/23/23 0753)  Pulse: (!) 57 (03/23/23 0753)  Resp: 20 (03/23/23 0753)  BP: 135/64 (03/23/23 0753)  SpO2: 97 % (03/23/23 0753)    Vital Signs Range (Last 24H):  Temp:  [98.1 °F (36.7 °C)]   Pulse:  [57]   Resp:  [20]   BP: (135)/(64)   SpO2:  [97 %]     Physical Exam  Cardiovascular:      Rate and Rhythm: Bradycardia present.   Pulmonary:      Effort: Pulmonary effort is normal.   Neurological:      Mental Status: He is alert and oriented to person, place, and time.           Assessment:      Direct access Colonoscopy for screening average risk for CRC by personal and family history today.      Plan:      Direct access Colonoscopy for screening average risk for CRC by personal and family history today.

## 2023-03-23 NOTE — PROVATION PATIENT INSTRUCTIONS
Discharge Summary/Instructions after an Endoscopic Procedure  Patient Name: Nilesh Almaraz  Patient MRN: [id number]  Patient YOB: 1950 Thursday, March 23, 2023  Conor Reid MD  Dear patient,  As a result of recent federal legislation (The Federal Cures Act), you may   receive lab or pathology results from your procedure in your [facility]   account before your physician is able to contact you. Your physician or   their representative will relay the results to you with their   recommendations at their soonest availability.  Thank you,  RESTRICTIONS:  During your procedure today, you received medications for sedation.  These   medications may affect your judgment, balance and coordination.  Therefore,   for 24 hours, you have the following restrictions:   - DO NOT drive a car, operate machinery, make legal/financial decisions,   sign important papers or drink alcohol.    ACTIVITY:  Today: no heavy lifting, straining or running due to procedural   sedation/anesthesia.  The following day: return to full activity including work.  DIET:  Eat and drink normally unless instructed otherwise.     TREATMENT FOR COMMON SIDE EFFECTS:  - Mild abdominal pain, nausea, belching, bloating or excessive gas:  rest,   eat lightly and use a heating pad.  - Sore Throat: treat with throat lozenges and/or gargle with warm salt   water.  - Because air was used during the procedure, expelling large amounts of air   from your rectum or belching is normal.  - If a bowel prep was taken, you may not have a bowel movement for 1-3 days.    This is normal.  SYMPTOMS TO WATCH FOR AND REPORT TO YOUR PHYSICIAN:  1. Abdominal pain or bloating, other than gas cramps.  2. Chest pain.  3. Back pain.  4. Signs of infection such as: chills or fever occurring within 24 hours   after the procedure.  5. Rectal bleeding, which would show as bright red, maroon, or black stools.   (A tablespoon of blood from the rectum is not serious, especially  if   hemorrhoids are present.)  6. Vomiting.  7. Weakness or dizziness.  GO DIRECTLY TO THE NEAREST EMERGENCY ROOM IF YOU HAVE ANY OF THE FOLLOWING:      Difficulty breathing              Chills and/or fever over 101 F   Persistent vomiting and/or vomiting blood   Severe abdominal pain   Severe chest pain   Black, tarry stools   Bleeding- more than one tablespoon   Any other symptom or condition that you feel may need urgent attention  Your doctor recommends these additional instructions:  If any biopsies were taken, your doctors clinic will contact you in 1 to 2   weeks with any results.  - Discharge patient to home.   - Repeat colonoscopy in 10 years for screening purposes.   - THIS RECOMMENDATION of 10-Years FOR NEXT SCREENING COLONOSCOPY ASSUMES   THAT YOU WILL NOT HAVE HAD OR NOT HAD A FIRST OR SECOND DEGREE RELATIVE/S   WITH COLORECTAL CANCER OR AN ADVANCE COLON ADENOMAS BEFORE THE AGE OF 60   YEARS OF AGE OF THOSE INDIVIDUALS BY THE TIME OF YOUR NEXT SCREENING   COLONOSCOPY.             - Return to referring physician.   - The findings and recommendations were discussed with the patient.  For questions, problems or results please call your physician - Conor Redi MD at Work:  [phone].  [facility], EMERGENCY ROOM PHONE NUMBER: [phone]  IF A COMPLICATION OR EMERGENCY SITUATION ARISES AND YOU ARE UNABLE TO REACH   YOUR PHYSICIAN - GO DIRECTLY TO THE EMERGENCY ROOM.  Conor Reid MD  3/23/2023 8:49:46 AM  This report has been verified and signed electronically.  Dear patient,  As a result of recent federal legislation (The Federal Cures Act), you may   receive lab or pathology results from your procedure in your [facility]   account before your physician is able to contact you. Your physician or   their representative will relay the results to you with their   recommendations at their soonest availability.  Thank you,  PROVATION

## 2023-03-24 ENCOUNTER — CLINICAL SUPPORT (OUTPATIENT)
Dept: REHABILITATION | Facility: HOSPITAL | Age: 73
End: 2023-03-24
Attending: INTERNAL MEDICINE
Payer: MEDICARE

## 2023-03-24 DIAGNOSIS — M25.512 CHRONIC LEFT SHOULDER PAIN: Primary | ICD-10-CM

## 2023-03-24 DIAGNOSIS — G89.29 CHRONIC LEFT SHOULDER PAIN: Primary | ICD-10-CM

## 2023-03-24 PROCEDURE — 97110 THERAPEUTIC EXERCISES: CPT | Mod: PO

## 2023-03-24 PROCEDURE — 97112 NEUROMUSCULAR REEDUCATION: CPT | Mod: PO

## 2023-03-24 PROCEDURE — 97530 THERAPEUTIC ACTIVITIES: CPT | Mod: PO

## 2023-03-24 NOTE — PROGRESS NOTES
"[facility]   Physical Therapy Treatment Note / Progress Note     Name: Nilesh Almaraz  Clinic Number: [id number]    Therapy Diagnosis:   Encounter Diagnosis   Name Primary?    Chronic left shoulder pain Yes           Physician: Ke Aguero, *    Visit Date: 3/24/2023    Physician Orders: PT Eval and Treat  Medical Diagnosis from Referral: M25.512,G89.29 (ICD-10-CM) - Chronic left shoulder pain  Evaluation Date: 2/16/2023  Authorization Period Expiration: 12/31/23  Plan of Care Expiration: 3/24/23  Progress Note Due: 3/24/23  Visit # / Visits authorized: 8/40  FOTO: 2/3     Precautions: Standard      Time In: 8:30 AM   Time Out: 9:15 AM  Total Appointment Time (timed & untimed codes): 45 minutes    SUBJECTIVE     Pt reports: he is moving and this will be his last visit  He was compliant with home exercise program.  Response to previous treatment: good  Functional change: lifting item it getting better     Pain: 0/10 (at rest), not rated /10   Location: left shoulder    OBJECTIVE     N/A    Treatment     Art received the treatments listed below:      Bold=performed   therapeutic exercises to develop strength, endurance, ROM, and posture for 25 minutes including:    UBE 3f/3b level 2  Supine dowel flexion 2x10 3" holds with 4# dowel  Serratus punches supine 3x10 4# dowel  Standing rows 3x10 GTB  Standing shoulder extensions 3x10 GTB  Wall slides abduction and flexion 2x10 with 3" holds each   +sleeper's stretch 10x10"   Supine shoulder horizontal ABD RTB 3x10   +Bilateral shoulder ER YTB 2x10   Shoulder trio 2# 3x10 each    Pt received neuromuscular re-education in order to improve scapulohumeral rhythm for 10 mins including:  prone Y's 2x10  bilaterally   prone T's 2x10 bilaterally   prone retraction and extension 2x10 bilaterally     Therapeutic activities 10 mins  Landmines 10# 3x10  Serratus wall slides YTB 3x10  Overhead carry 3 laps 4# DB     Patient Education and Home " Exercises     Home Exercises Provided and Patient Education Provided     Education provided:   - pt educated on all interventions performed today  - HEP   - POC     Written Home Exercises Provided: yes. Exercises were reviewed and Art was able to demonstrate them prior to the end of the session.  Art demonstrated good  understanding of the education provided. See EMR under Patient Instructions for exercises provided during therapy sessions    ASSESSMENT     D/C per patient moving    Art Is progressing well towards his goals.   Pt prognosis is Excellent.     Pt will continue to benefit from skilled outpatient physical therapy to address the deficits listed in the problem list box on initial evaluation, provide pt/family education and to maximize pt's level of independence in the home and community environment.     Pt's spiritual, cultural and educational needs considered and pt agreeable to plan of care and goals.     Anticipated barriers to physical therapy: leaving the state at end of March     GOALS:     Short Term Goals:  2 weeks  1.Report decreased shoulder pain < / =  5/10 at worst  to increase tolerance for overhead lifting. -MET  2. Increase PROM by 10 degrees where limited. -MET  3. Increased strength by 1/3 MMT grade in areas of limitation to increase tolerance for ADL and work activities.-MET  4. Pt to tolerate HEP to improve ROM and independence with ADL's-MET     Long Term Goals: 5 weeks  1.Report decreased shoulder pain  < / =  2 /10 at worst to increase tolerance for overhead lifting  2.Increase AROM to within 5 degrees of contralateral flexion. -MET  3.Increase strength to >/= 4/5 in areas of limitation to increase tolerance for ADL and work activities.-progressing   4. Pt goal: able to reach high cabinet with no pain -almost met   5. Pt will demonstrate 100% recall of HEP with no assistance from therapist. -MET    PLAN   D/C    Wan Thomas, PT

## 2023-05-17 ENCOUNTER — TELEPHONE (OUTPATIENT)
Dept: INTERNAL MEDICINE | Facility: CLINIC | Age: 73
End: 2023-05-17
Payer: MEDICARE

## 2023-05-17 DIAGNOSIS — Z12.11 COLON CANCER SCREENING: Primary | ICD-10-CM

## 2023-05-17 NOTE — TELEPHONE ENCOUNTER
----- Message from Leonor Gaston sent at 5/16/2023  7:59 PM CDT -----  Patient is in need of a new colonoscopy referral due to expiration date needing to be pushed further out. Thanks

## 2023-10-18 ENCOUNTER — LAB VISIT (OUTPATIENT)
Dept: LAB | Facility: HOSPITAL | Age: 73
End: 2023-10-18
Attending: UROLOGY
Payer: MEDICARE

## 2023-10-18 DIAGNOSIS — C61 PROSTATE CANCER: ICD-10-CM

## 2023-10-18 LAB — COMPLEXED PSA SERPL-MCNC: 1.5 NG/ML (ref 0–4)

## 2023-10-18 PROCEDURE — 84153 ASSAY OF PSA TOTAL: CPT | Performed by: UROLOGY

## 2023-10-18 PROCEDURE — 36415 COLL VENOUS BLD VENIPUNCTURE: CPT | Mod: PO | Performed by: UROLOGY

## 2023-10-18 NOTE — PROGRESS NOTES
Clinic Note  10/18/2023      Subjective:         Chief Complaint:   HPI  Nilesh Almaraz is a 72 y.o. male with prostate      cancer. Consult from Dr. Aguero.  Dr. Mcdonough did biopsy 2/9/2012- B9 path with acute and chronic inflammation (PSA 3.5).  Negative family history. 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 Has place in [place] since [place].  On flomax for BPH.  Underwent XRT in [place], [place] 07/2022.   PSA decreased since treatment.      Of note, the patient opted out of any androgen deprivation therapy around his external beam radiation.  He also reports opting for fewer treatments in higher dosages which is not what his radiation oncologist suggested.  However, on further discussion it sounds like this just sounds like hypofractionation.  He reports getting the same cumulative dose of radiation.        FH -negative  PSA - 8.4  Stage - T1C  Volume - 63 ccs  MRI - 3/16/2022- 1.4 cm LBPZ PI-RADS 3  Biopsy - 4/12/2022- [place] 4+3 (GG3) right base 2/2 17%.80% Krystian 4. 2/14 +/   MARY ELLEN Score - 5 intermediate  NCCN - unfavorable intermediate  Germline testing -not indicated  Somatic testing - Prolaris score 2.8 ( 4.6 % METS rate at 10 years with AS, 3.1 % mets rate with single therapy at 10 years.     3/7/2023- PSA now 2.9 (3.1).      Still spending half his time in [place].   Erections: Very strong  LUTS: some mild urgency. Rare leakage. Colonoscopy next week.    10/20/2023- PSA- 1.5 (prior 2.9). [place] [facility] has gone downhill. On license of [facility] has hospital in [place].      Lab Results   Component Value Date    PSA 7.0 (H) 11/08/2018    PSA 4.7 (H) 05/22/2017    PSA 6.8 (H) 03/29/2016    PSA 5.0 (H) 10/16/2013    PSA 3.52 09/27/2012    PSA 3.4 08/04/2011    PSA 4.5 (H) 07/20/2011    PSADIAG 2.9 03/03/2023    PSADIAG 3.1 11/11/2022    PSADIAG 8.4 (H) 01/12/2022    PSADIAG 4.3 (H) 10/03/2016    PSATOTAL 3.5 08/04/2011    PSAFREE 0.83 08/04/2011     PSAFREEPCT 23.71 08/04/2011      Past Medical History:   Diagnosis Date    Allergy     Anxiety     BPH (benign prostatic hypertrophy)     Prediabetes     Primary osteoarthritis of first carpometacarpal joint of left hand 3/8/2023    Prostate cancer     Prostatism     Skin cancer      Family History   Problem Relation Age of Onset    Diabetes Mother     Cancer Brother         osteo sarcoma    Colon polyps Neg Hx     Heart disease Neg Hx     Amblyopia Neg Hx     Blindness Neg Hx     Cataracts Neg Hx     Glaucoma Neg Hx     Hypertension Neg Hx     Macular degeneration Neg Hx     Retinal detachment Neg Hx     Strabismus Neg Hx     Stroke Neg Hx     Thyroid disease Neg Hx      Social History     Socioeconomic History    Marital status:      Spouse name: Palmira    Number of children: 2   Occupational History    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iest     Employer: [employer]   Tobacco Use    Smoking status: Never    Smokeless tobacco: Never   Substance and Sexual Activity    Alcohol use: Yes     Alcohol/week: 2.0 standard drinks of alcohol     Types: 2 Glasses of wine per week     Comment: daily    Drug use: No    Sexual activity: Yes     Partners: Female     Social Determinants of Health     Financial Resource Strain: Low Risk  (10/17/2023)    Overall Financial Resource Strain (CARDIA)     Difficulty of Paying Living Expenses: Not hard at all   Food Insecurity: No Food Insecurity (10/17/2023)    Hunger Vital Sign     Worried About Running Out of Food in the Last Year: Never true     Ran Out of Food in the Last Year: Never true   Transportation Needs: No Transportation Needs (10/17/2023)    PRAPARE - Transportation     Lack of Transportation (Medical): No     Lack of Transportation (Non-Medical): No   Physical Activity: Insufficiently Active (10/17/2023)    Exercise Vital Sign     Days of Exercise per Week: 3 days     Minutes of Exercise per Session: 40 min   Stress: No Stress Concern Present (10/17/2023)    [place] of  Occupational Health - Occupational Stress Questionnaire     Feeling of Stress : Only a little   Social Connections: Unknown (10/17/2023)    Social Connection and Isolation Panel [NHANES]     Frequency of Communication with Friends and Family: Once a week     Frequency of Social Gatherings with Friends and Family: Once a week     Active Member of Clubs or Organizations: Yes     Attends Club or Organization Meetings: More than 4 times per year     Marital Status:    Housing Stability: Low Risk  (10/17/2023)    Housing Stability Vital Sign     Unable to Pay for Housing in the Last Year: No     Number of Places Lived in the Last Year: 2     Unstable Housing in the Last Year: No     Past Surgical History:   Procedure Laterality Date    COLONOSCOPY N/A 3/23/2023    Procedure: COLONOSCOPY;  Surgeon: Conor Reid MD;  Location: [facility] ([address] [place] [address]);  Service: Endoscopy;  Laterality: N/A;  suprep-inst portal-tb  pre call done, pt requesting earlier time on day of if one becomes available- AJ  3/21-updated instructions sent to [facility], pt contacted for earlier arrival time-KPvt    FRACTURE SURGERY      left ankle    HERNIA REPAIR      hydrocele      SKIN LESION EXCISION       Patient Active Problem List   Diagnosis    Prostate cancer    Hypercholesteremia    Bradycardia    History of COVID-19 in Oct 2022    Hemorrhoids    Chronic left shoulder pain    Primary osteoarthritis of first carpometacarpal joint of left hand     Review of Systems   Constitutional:  Negative for appetite change, chills, fatigue, fever and unexpected weight change.   HENT:  Negative for nosebleeds.    Respiratory:  Negative for shortness of breath and wheezing.    Cardiovascular:  Negative for chest pain, palpitations and leg swelling.   Gastrointestinal:  Negative for abdominal distention, abdominal pain, constipation, diarrhea, nausea and vomiting.   Genitourinary:  Negative for dysuria and hematuria.   Musculoskeletal:  Negative for  arthralgias and back pain.   Skin:  Negative for pallor.   Neurological:  Negative for dizziness, seizures and syncope.   Hematological:  Negative for adenopathy.   Psychiatric/Behavioral:  Negative for dysphoric mood.          Objective:      There were no vitals taken for this visit.  Estimated body mass index is 22.78 kg/m² as calculated from the following:    Height as of 3/23/23: 6' (1.829 m).    Weight as of 3/23/23: 76.2 kg (168 lb).  Physical Exam      Assessment and Plan:           Problem List Items Addressed This Visit       Prostate cancer - Primary       Follow up:   Recommend annual PSA.    Roman Mcdonough

## 2023-10-19 ENCOUNTER — OFFICE VISIT (OUTPATIENT)
Dept: UROLOGY | Facility: CLINIC | Age: 73
End: 2023-10-19
Payer: MEDICARE

## 2023-10-19 VITALS
BODY MASS INDEX: 23.81 KG/M2 | DIASTOLIC BLOOD PRESSURE: 71 MMHG | HEART RATE: 50 BPM | WEIGHT: 175.81 LBS | HEIGHT: 72 IN | SYSTOLIC BLOOD PRESSURE: 129 MMHG

## 2023-10-19 DIAGNOSIS — C61 PROSTATE CANCER: Primary | ICD-10-CM

## 2023-10-19 PROCEDURE — 99999 PR PBB SHADOW E&M-EST. PATIENT-LVL III: ICD-10-PCS | Mod: PBBFAC,,, | Performed by: UROLOGY

## 2023-10-19 PROCEDURE — 99214 OFFICE O/P EST MOD 30 MIN: CPT | Mod: S$PBB,,, | Performed by: UROLOGY

## 2023-10-19 PROCEDURE — 99213 OFFICE O/P EST LOW 20 MIN: CPT | Mod: PBBFAC | Performed by: UROLOGY

## 2023-10-19 PROCEDURE — 99214 PR OFFICE/OUTPT VISIT, EST, LEVL IV, 30-39 MIN: ICD-10-PCS | Mod: S$PBB,,, | Performed by: UROLOGY

## 2023-10-19 PROCEDURE — 99999 PR PBB SHADOW E&M-EST. PATIENT-LVL III: CPT | Mod: PBBFAC,,, | Performed by: UROLOGY

## 2023-10-19 RX ORDER — TAMSULOSIN HYDROCHLORIDE 0.4 MG/1
0.4 CAPSULE ORAL 2 TIMES DAILY
Qty: 90 CAPSULE | Refills: 3 | Status: SHIPPED | OUTPATIENT
Start: 2023-10-19 | End: 2024-03-15 | Stop reason: SDUPTHER

## 2023-12-29 ENCOUNTER — OFFICE VISIT (OUTPATIENT)
Dept: SPORTS MEDICINE | Facility: CLINIC | Age: 73
End: 2023-12-29
Payer: MEDICARE

## 2023-12-29 ENCOUNTER — HOSPITAL ENCOUNTER (OUTPATIENT)
Dept: RADIOLOGY | Facility: HOSPITAL | Age: 73
Discharge: HOME | End: 2023-12-29
Attending: ORTHOPAEDIC SURGERY
Payer: MEDICARE

## 2023-12-29 VITALS
SYSTOLIC BLOOD PRESSURE: 119 MMHG | HEIGHT: 72 IN | DIASTOLIC BLOOD PRESSURE: 67 MMHG | BODY MASS INDEX: 23.85 KG/M2 | HEART RATE: 55 BPM | WEIGHT: 176.06 LBS

## 2023-12-29 DIAGNOSIS — M25.571 RIGHT ANKLE PAIN, UNSPECIFIED CHRONICITY: ICD-10-CM

## 2023-12-29 DIAGNOSIS — M25.571 ACUTE RIGHT ANKLE PAIN: ICD-10-CM

## 2023-12-29 DIAGNOSIS — M76.61 ACHILLES TENDINITIS OF RIGHT LOWER EXTREMITY: Primary | ICD-10-CM

## 2023-12-29 PROCEDURE — 99999 PR PBB SHADOW E&M-EST. PATIENT-LVL III: ICD-10-PCS | Mod: PBBFAC,,, | Performed by: ORTHOPAEDIC SURGERY

## 2023-12-29 PROCEDURE — 73610 X-RAY EXAM OF ANKLE: CPT | Mod: 26,RT,, | Performed by: RADIOLOGY

## 2023-12-29 PROCEDURE — 99213 OFFICE O/P EST LOW 20 MIN: CPT | Mod: PBBFAC | Performed by: ORTHOPAEDIC SURGERY

## 2023-12-29 PROCEDURE — 73610 XR ANKLE COMPLETE 3 VIEW RIGHT: ICD-10-PCS | Mod: 26,RT,, | Performed by: RADIOLOGY

## 2023-12-29 PROCEDURE — 99999 PR PBB SHADOW E&M-EST. PATIENT-LVL III: CPT | Mod: PBBFAC,,, | Performed by: ORTHOPAEDIC SURGERY

## 2023-12-29 PROCEDURE — 99213 PR OFFICE/OUTPT VISIT, EST, LEVL III, 20-29 MIN: ICD-10-PCS | Mod: S$PBB,,, | Performed by: ORTHOPAEDIC SURGERY

## 2023-12-29 PROCEDURE — 99213 OFFICE O/P EST LOW 20 MIN: CPT | Mod: S$PBB,,, | Performed by: ORTHOPAEDIC SURGERY

## 2023-12-29 PROCEDURE — 73610 X-RAY EXAM OF ANKLE: CPT | Mod: TC,RT

## 2023-12-29 NOTE — PROGRESS NOTES
Subjective:     Chief Complaint: Nilesh Almaraz is a 73 y.o. male who had concerns including Pain of the Left Foot.    HPI    Patient who is an avid walker presents to clinic with acute right ankle pain x 6 weeks. Patient states the pain began following completion of a 3 mi hike out of state.  He denies any MIRIAM or traumatic event.  Pain is localized along the Achilles tendon approximately 6 in proximal to the calcaneal insertion.  He rates the pain as 6/10 and made worse with walking and plantar flexion.  He has attempted multiple conservative measures that include activity modification, ice & elevation, and oral medications (ibuprofen), with little relief.  Is affecting ADLs and limiting desired level of activity, as he would like to get back to hiking pain-free. Denies numbness, tingling, radiation, and inability to bear weight. He is here today to discuss treatment options.    No previous surgeries or trauma bilateral ankle      Review of Systems   Constitutional: Negative.   HENT: Negative.     Eyes: Negative.    Cardiovascular: Negative.    Respiratory: Negative.     Endocrine: Negative.    Hematologic/Lymphatic: Negative.    Skin: Negative.    Musculoskeletal:  Positive for myalgias. Negative for falls, joint pain, joint swelling, muscle weakness and stiffness.   Neurological: Negative.    Psychiatric/Behavioral: Negative.     Allergic/Immunologic: Negative.        Pain Related Questions  Over the past 3 days, what was your highest pain level?: 7  Over the past 3 days, what was your lowest pain level? : 6    Other  How many nights a week are you awakened by your affected body part?: 0  Was the patient's HEIGHT measured or patient reported?: Patient Reported  Was the patient's WEIGHT measured or patient reported?: Measured    Objective:     General: Nilesh is well-developed, well-nourished, appears stated age, in no acute distress, alert and oriented to time, place and person.     General    Vitals  reviewed.  Constitutional: He is oriented to person, place, and time. He appears well-developed and well-nourished. No distress.   HENT:   Head: Normocephalic and atraumatic.   Eyes: EOM are normal.   Cardiovascular:  Normal rate.            Pulmonary/Chest: Effort normal.   Neurological: He is alert and oriented to person, place, and time. He has normal reflexes. Coordination normal.   Psychiatric: He has a normal mood and affect. His behavior is normal. Judgment and thought content normal.     General Musculoskeletal Exam   Gait: normal     Right Ankle/Foot Exam   Right ankle exam is normal.    Inspection   Scars: absent  Deformity: absent  Erythema: absent  Bruising: Ankle - absent Foot - absent  Effusion: Ankle - absent Foot - absent  Atrophy: Ankle - absent Foot - absent    Tenderness   The patient is tender to palpation of the Achilles tendon.    Pain   The patient exhibits pain of the Achilles tendon.    Range of Motion   The patient has normal right ankle ROM.  Ankle Joint   Dorsiflexion:  20 normal   Plantar flexion:  50 normal   Subtalar Joint   Inversion:  50 normal   Eversion:  30 normal   Hughes Test:  negative  First MTP Joint: normal    Alignment   Knee Alignment: neutral  Midfoot Alignment: normal  Forefoot Alignment: normal    Tests   Anterior drawer: negative  Varus tilt: negative  Heel Walk: able to perform  Tiptoe Walk: able to perform  Single Heel Rise: able to perform  External Rotation Test: negative  Squeeze Test: negative    Other   Sensation: normal  Peroneal Subluxation: negative    Left Ankle/Foot Exam   Left ankle exam is normal.    Inspection  Deformity: absent  Erythema: absent  Bruising: Ankle - absent Foot - absent  Effusion: Ankle - absent Foot - absent  Atrophy: Ankle - absent Foot - absent  Scars: absent    Range of Motion   The patient has normal left ankle ROM.   Ankle Joint  Dorsiflexion:  20 normal   Plantar flexion:  50 normal     Subtalar Joint   Inversion:  50 normal    Eversion:  30 normal   Hughes Test:  normal  First MTP Joint: normal    Alignment   Knee Alignment: neutral  Midfoot Alignment: normal  Forefoot Alignment: normal    Tests   Anterior drawer: negative  Varus tilt: negative  Heel Walk: able to perform  Tiptoe Walk: able to perform  Single Heel Rise: able to perform  External Rotation Test: negative  Squeeze Test: absent    Other   Sensation: normal  Peroneal Subluxation: negative      Muscle Strength   Right Lower Extremity   Anterior tibial:  5/5   Posterior tibial:  5/5   Gastrocsoleus:  5/5   Peroneal muscle:  5/5   EHL:  5/5  FDL: 5/5  EDL: 5/5  FHL: 5/5  Left Lower Extremity   Anterior tibial:  5/5   Posterior tibial:  5/5   Gastrocsoleus:  5/5   Peroneal muscle:  5/5   EHL:  5/5  FDL: 5/5  EDL: 5/5  FHL: 5/5    Reflexes     Left Side  Achilles:  2+  Babinski Sign:  absent  Ankle Clonus:  absent    Right Side   Achilles:  2+  Babinski Sign:  absent  Ankle Clonus:  absent    Vascular Exam     Right Pulses  Dorsalis Pedis:      2+  Posterior Tibial:      2+        Left Pulses  Dorsalis Pedis:      2+  Posterior Tibial:      2+        Radiographs right ankle     My interpretation:    No signs of fracture, contusion, swelling, DJD, or any other bony abnormalities noted.        Assessment:     Encounter Diagnoses   Name Primary?    Acute right ankle pain     Achilles tendinitis of right lower extremity Yes        Plan:     1. I made the decision to order new imaging of the extremity or extremities evaluated. I independently reviewed and interpreted the radiographs and/or MRIs today. These images were shown to the patient where I then discussed my findings in detail.    2. We discussed at length different treatment options including conservative vs surgical management. These include anti-inflammatories, acetaminophen, rest, ice, heat, formal physical therapy including strengthening and stretching exercises, home exercise programs, dry needling, and finally surgical  intervention.  After explaining diagnosis with the patient, I recommended conserve measures at this time.  We discussed a 2 to three-week period of complete rest and refraining from any hiking.  We can also do physical therapy during this time, for which she agreed to.      3. Ambulatory referral for formal physical therapy at [facility] with focus on right ankle.    4.Ice compress to the affected area 2-3x a day for 15-20 minutes as needed for pain management.  Refrain from any hiking for the next 2-3 weeks.    5. RTC to see Benji Pedroza PA-C in 4 weeks for follow-up.  Will reassess elbow pain and determine if any further management is necessary to include possible PRP injections.      All of the patient's questions were answered. Patient was advised to call the clinic or contact me through the patient portal for any questions or concerns.       Medical Dictation software was used during the dictation of portions or the entirety of this medical record.  Phonetic or grammatic errors may exist due to the use of this software. For clarification, refer to the author of the document.        Patient questionnaires may have been collected.

## 2024-01-05 ENCOUNTER — CLINICAL SUPPORT (OUTPATIENT)
Dept: REHABILITATION | Facility: HOSPITAL | Age: 74
End: 2024-01-05
Payer: MEDICARE

## 2024-01-05 DIAGNOSIS — R26.2 DIFFICULTY WALKING: Primary | ICD-10-CM

## 2024-01-05 DIAGNOSIS — M25.571 ACUTE RIGHT ANKLE PAIN: ICD-10-CM

## 2024-01-05 DIAGNOSIS — M76.61 ACHILLES TENDINITIS OF RIGHT LOWER EXTREMITY: ICD-10-CM

## 2024-01-05 PROCEDURE — 97110 THERAPEUTIC EXERCISES: CPT | Mod: PO

## 2024-01-05 PROCEDURE — 97161 PT EVAL LOW COMPLEX 20 MIN: CPT | Mod: PO

## 2024-01-05 NOTE — PLAN OF CARE
[facility]   Physical Therapy Initial Evaluation      Name: Nilesh Almaraz  [facility] Number: [id number]    Therapy Diagnosis:   Encounter Diagnoses   Name Primary?    Acute right ankle pain     Achilles tendinitis of right lower extremity     Difficulty walking Yes        Physician: Khang Pedroza*    Physician Orders: PT Eval and Treat   Medical Diagnosis from Referral: Acute right ankle pain [M25.571], Achilles tendinitis of right lower extremity [M76.61]   Evaluation Date: 1/5/2024  Authorization Period Expiration: 12/28/2024  Plan of Care Expiration: 4/5/2024  Progress Note Due: 2/5/2024  Date of Surgery: na   Visit # / Visits authorized: 1/ 1   FOTO: 1/ 3    Precautions: Standard, pmh prostate  CA (remission >1 year)    Time In: 9:00 a   Time Out: 9:55 a   Total Billable Time: 55 minutes    Subjective     Date of onset: November 2023     History of current condition - Art reports:     In November of 2023, he was finished walking 3.5 miles and started to get right achilles pain. He has had this before and had PT that helped. He is an avid walker and hiker. He denies surgeries, denies NT. He knows it is not torn.     C/O: Right achilles pain with walking, duration 2 months     Falls: denies     Imaging:   FINDINGS: R ANKLE   Mild DJD.  No fracture or dislocation no bone destruction identified       Prior Therapy: yes  Social History:  lives with their spouse  Occupation: retired   Prior Level of Function: indep   Current Level of Function: indep     Pain:  Current 2/10, worst 5/10, best 0/10   Location: right achilles pain   Description: Dull  Aggravating Factors: walking   Easing Factors: resting     Patients goals: reduce pain with walking      Medical History:   Past Medical History:   Diagnosis Date    Allergy     Anxiety     BPH (benign prostatic hypertrophy)     Prediabetes     Primary osteoarthritis of first carpometacarpal joint of left hand 3/8/2023    Prostate cancer      Prostatism     Skin cancer        Surgical History:   Nilesh Almaraz  has a past surgical history that includes Fracture surgery; Hernia repair; hydrocele; Skin lesion excision; and Colonoscopy (N/A, 3/23/2023).    Medications:   Nilesh has a current medication list which includes the following prescription(s): b-complex with vitamin c, ibuprofen, psyllium husk, and tamsulosin.    Allergies:   Review of patient's allergies indicates:   Allergen Reactions    Penicillins      Other reaction(s): Hives  Patient received rocephin with no complications        Objective      POSTURE   Normal foot posture     GAIT   Antalgic mild,  decreased right toe off     ANKLE AROM in deg   LEFT   DF 20   PF 50  IN 40  EV 5 (reports previous lateral ankle surgery 2007)    RIGHT   DF 15  PF 50  INV 40  EVER 30    ANKLE MMT   LEFT 5/5 all directions     RIGHT ANKLE MSTT (submax testing)  Strong and painless all directions     SPECIAL TESTS   Hughes test neg R     PALPATION   Mild ttp watershed zone R achilles   Mild increased achilles tone     MOBILITY R and L   TC   Anterior 3/6; post 2/6     Subtalar 3/6 all directions     MLA   Short gastroc/soleus and extensor muscles           Intake Outcome Measure for FOTO LE Survey    Therapist reviewed FOTO scores for Nilesh Almaraz on 1/5/2024.   FOTO report - see Media section or FOTO account episode details.    Intake Score: 60%         Treatment     Total Treatment time (time-based codes) separate from Evaluation: 20 minutes     Art received the treatments listed below:      therapeutic exercises to develop strength, endurance, ROM, flexibility, posture, and core stabilization for 20 minutes including:    Eccentric PF, green 20x   Ankle ISO 6s x 6 EVER/INV green   Gastroc stretch in standing 3 x 45s (gentle)  standing great toe stretch 3 x 20s  therabar   Standing extensor stretching 45s x 3     ICE massage R achilles 4 minutes         Patient Education and Home Exercises      Education provided:   - HEP  - ICE massage benefits  - RICE   - limit unlevel terrain walking/hiking  - etiology    Written Home Exercises Provided: yes. Exercises were reviewed and Art was able to demonstrate them prior to the end of the session.  Art demonstrated good  understanding of the education provided. See EMR under Patient Instructions for exercises provided during therapy sessions.    Assessment     Nilesh is a 73 y.o. male referred to outpatient Physical Therapy with a medical diagnosis of Acute right ankle pain [M25.571], Achilles tendinitis of right lower extremity [M76.61] . Patient presents with s/s consistent with RIGHT ANKLE PAIN with POWER COORDINATION DEFICITS likely secondary to ACHILLES TENDINITIS. Pt presents with right ankle flexibility and mobility deficits likely contributing to his symptoms. Pt will benefit from skilled PT for return to walking without limitations.     Patient prognosis is Excellent.   Patient will benefit from skilled outpatient Physical Therapy to address the deficits stated above and in the chart below, provide patient /family education, and to maximize patientt's level of independence.     Plan of care discussed with patient: Yes  Patient's spiritual, cultural and educational needs considered and patient is agreeable to the plan of care and goals as stated below:     Anticipated Barriers for therapy: none    Medical Necessity is demonstrated by the following  History  Co-morbidities and personal factors that may impact the plan of care [x] LOW: no personal factors / co-morbidities  [] MODERATE: 1-2 personal factors / co-morbidities  [] HIGH: 3+ personal factors / co-morbidities    Moderate / High Support Documentation:   Co-morbidities affecting plan of care:     Personal Factors:        Examination  Body Structures and Functions, activity limitations and participation restrictions that may impact the plan of care [x] LOW: addressing 1-2 elements  [] MODERATE: 3+  elements  [] HIGH: 4+ elements (please support below)    Moderate / High Support Documentation:      Clinical Presentation [x] LOW: stable  [] MODERATE: Evolving  [] HIGH: Unstable     Decision Making/ Complexity Score: low       Goals:  Short Term Goals: 4 weeks   Pt demonstrate indep with initial hep   Pt demonstrate normalized gait pattern without pain    Long Term Goals: 12 weeks   Pt demonstrate improved foto score from 60 to 70  Pt demonstrate no difficulty with tasks like stair navigation  Pt demonstrate ability to walk 1 mile pain-free   Plan     Plan of care Certification: 1/5/2024 to 4/5/2024.    Outpatient Physical Therapy 1 times weekly for 10 weeks to include the following interventions: Gait Training, Manual Therapy, Moist Heat/ Ice, Neuromuscular Re-ed, Patient Education, Self Care, Therapeutic Activities, and Therapeutic Exercise.     Kirill Juarez, PT        Physician's Signature: _________________________________________ Date: ________________

## 2024-01-11 DIAGNOSIS — Z00.00 ENCOUNTER FOR MEDICARE ANNUAL WELLNESS EXAM: ICD-10-CM

## 2024-01-12 ENCOUNTER — CLINICAL SUPPORT (OUTPATIENT)
Dept: REHABILITATION | Facility: HOSPITAL | Age: 74
End: 2024-01-12
Payer: MEDICARE

## 2024-01-12 DIAGNOSIS — R26.2 DIFFICULTY WALKING: Primary | ICD-10-CM

## 2024-01-12 PROCEDURE — 97110 THERAPEUTIC EXERCISES: CPT | Mod: PO,CQ

## 2024-01-12 NOTE — PATIENT INSTRUCTIONS
Supine Hamstring Stretch    Raise your leg with belt around heel of foot. Raise leg until a stretch is felt in the back of the thigh. Keep knee straight. Hold 10 seconds.   Repeat 10 times each side per set. Do 1 sets per session. Do 2 sessions per day.    Plantar Flexion: Resisted        Cher[place] behind, tubing around left foot, press down.  Repeat 10 times per set. Do 1 sets per session. Do 2 sessions per day.     [email or web address] Get [email or web address] [email or web address]     Dorsiflexion: Resisted        Facing anchor, tubing around left foot, pull toward face.   Repeat 10 times per set. Do 1 sets per session. Do 2 sessions per day.     [email or web address]     Eversion: Resisted        With right foot in tubing loop, hold tubing around other foot to resist and turn foot out.  Repeat 10 times per set. Do 1 sets per session. Do 2 sessions per day.     [email or web address] To Get Approved [email or web address]     Inversion: Resisted        Cross legs with right leg underneath, foot in tubing loop. Hold tubing around other foot to resist and turn foot in.  Repeat 10 times per set. Do 1 sets per session. Do 2 sessions per day.     [email or web address]      Heel Raise: Bilateral (Standing)        Rise on balls of feet.  Repeat 10 times per set. Do 1 sets per session. Do 2 sessions per day.     [email or web address] Get [email or web address] [email or web address]

## 2024-01-12 NOTE — PROGRESS NOTES
[facility]   Physical Therapy Treatment Note      Name: Nilesh Almaraz  [facility] Number: [id number]    Therapy Diagnosis:   Encounter Diagnosis   Name Primary?    Difficulty walking Yes     Physician: Khang Pedroza*    Visit Date: 1/12/2024      Physician Orders: PT Eval and Treat   Medical Diagnosis from Referral: Acute right ankle pain [M25.571], Achilles tendinitis of right lower extremity [M76.61]   Evaluation Date: 1/5/2024  Authorization Period Expiration: 12/28/2024  Plan of Care Expiration: 4/5/2024  Progress Note Due: 2/5/2024  Date of Surgery: na   Visit # / Visits authorized: 1/ 1, 1/20   FOTO: 2/ 3     Precautions: Standard, pmh prostate  CA (remission >1 year)     Time In: 8:00 am   Time Out: 8:55 am   Total Billable Time: 55 minutes    PTA Visit #: 1/5       Subjective     Patient reports: his pain is not too bad right now.  He was compliant with home exercise program.  Response to previous treatment: 1st after visit.  Functional change: not at this time.    Pain: 4/10  Location: right ankle     Objective      Objective Measures updated at progress report unless specified.     Treatment     Art received the treatments listed below:         therapeutic exercises to develop strength, endurance, ROM, flexibility, posture, and core stabilization for 55 minutes including:     Eccentric PF, green 20x   Ankle ISO 6s x 6 EVER/INV green   Gastroc stretch in standing 3 x 45s (gentle)  standing great toe stretch 3 x 20s  therabar   Standing extensor stretching 45s x 3   Heel raises on 2 inch stool  Standing quad stretch 5 x 10 seconds  Hamstring stretch with strap 10 x 10 seconds    ICE massage R achilles 0 minutes         Patient Education and Home Exercises       Education provided:   - keep all exercises in a pain free range.    Written Home Exercises Provided: yes. Exercises were reviewed and Art was able to demonstrate them prior to the end of the session.  Art  demonstrated good  understanding of the education provided. See Electronic Medical Record under Patient Instructions for exercises provided during therapy sessions    Assessment     Nilesh is a 73 y.o. male referred to outpatient Physical Therapy with a medical diagnosis of Acute right ankle pain, Achilles tendinitis of right lower extremity.  Patient required minimal cueing for proper execution of his exercises. Instructed patient to keep all exercises and stretches in a pain free range.     Art Is progressing well towards his goals.   Patient prognosis is Excellent.     Patient will continue to benefit from skilled outpatient physical therapy to address the deficits listed in the problem list box on initial evaluation, provide pt/family education and to maximize pt's level of independence in the home and community environment.     Patient's spiritual, cultural and educational needs considered and pt agreeable to plan of care and goals.     Anticipated barriers to physical therapy: none.    Goals:  Short Term Goals: 4 weeks   Pt demonstrate indep with initial hep   Pt demonstrate normalized gait pattern without pain     Long Term Goals: 12 weeks   Pt demonstrate improved foto score from 60 to 70  Pt demonstrate no difficulty with tasks like stair navigation  Pt demonstrate ability to walk 1 mile pain-free     Plan     Continue with Plan Of Care and progress toward therapist goals.    Armond Harris, PTA

## 2024-01-18 ENCOUNTER — CLINICAL SUPPORT (OUTPATIENT)
Dept: REHABILITATION | Facility: HOSPITAL | Age: 74
End: 2024-01-18
Payer: MEDICARE

## 2024-01-18 DIAGNOSIS — R26.2 DIFFICULTY WALKING: Primary | ICD-10-CM

## 2024-01-18 PROCEDURE — 97110 THERAPEUTIC EXERCISES: CPT | Mod: PO

## 2024-01-18 PROCEDURE — 97140 MANUAL THERAPY 1/> REGIONS: CPT | Mod: PO

## 2024-01-18 NOTE — PROGRESS NOTES
[facility]   Physical Therapy Treatment Note      Name: Nilesh Almaraz  [facility] Number: [id number]    Therapy Diagnosis:   Encounter Diagnosis   Name Primary?    Difficulty walking Yes     Physician: Khang Pedroza*    Visit Date: 1/18/2024      Physician Orders: PT Eval and Treat   Medical Diagnosis from Referral: Acute right ankle pain [M25.571], Achilles tendinitis of right lower extremity [M76.61]   Evaluation Date: 1/5/2024  Authorization Period Expiration: 12/28/2024  Plan of Care Expiration: 4/5/2024  Progress Note Due: 2/5/2024  Date of Surgery: na   Visit # / Visits authorized: 1/ 1, 2/20   FOTO: 2/ 3     Precautions: Standard, pmh prostate  CA (remission >1 year)     Time In: 9:00 am   Time Out: 9:55 am   Total Billable Time: 23 minutes 1-1 with PT' remainder time with tech     PTA Visit #: 1/5       Subjective     Patient reports: he is making progress. He can walk without a limp. He reports the pain changes bases off of the shoe.   He was compliant with home exercise program.  Response to previous treatment: ongoing   Functional change: ongoing     Pain: 2/10  Location: right ankle   C/O: Right achilles pain with walking, duration 2 months     Objective      Objective Measures updated at progress report unless specified.     Treatment     Art received the treatments listed below:         therapeutic exercises to develop strength, endurance, ROM, flexibility, posture, and core stabilization for 40 minutes including:     Ankle ISO 6s x 6 EVER/INV blue   Gastroc stretch in standing 3 x 45s (gentle)  standing great toe stretch 3 x 20s  therabar   Standing extensor stretching 45s x 3   Heel raises eccentric 3 x 10  Standing quad stretch 5 x 10 seconds  Hamstring stretch with strap 10 x 10 seconds    Next visit consider:   Progressive gastroc eccentrics   Ice massage       Manual therapy: 15 minutes   IASTM R achilles           Patient Education and Home Exercises        Education provided:   HEP review     Written Home Exercises Provided: yes. Exercises were reviewed and Art was able to demonstrate them prior to the end of the session.  Art demonstrated good  understanding of the education provided. See Electronic Medical Record under Patient Instructions for exercises provided during therapy sessions    Assessment     Pt reports positive carry over between visits with decreased right achilles pain with walking. Manual therapy performed today to improve pain levels. Pt requires minimal cues for proper mechanics with exercises. Plan to progress as tolerated.     Art Is progressing well towards his goals.   Patient prognosis is Excellent.     Patient will continue to benefit from skilled outpatient physical therapy to address the deficits listed in the problem list box on initial evaluation, provide pt/family education and to maximize pt's level of independence in the home and community environment.     Patient's spiritual, cultural and educational needs considered and pt agreeable to plan of care and goals.     Anticipated barriers to physical therapy: none.    Goals:  Short Term Goals: 4 weeks   Pt demonstrate indep with initial hep   Pt demonstrate normalized gait pattern without pain     Long Term Goals: 12 weeks   Pt demonstrate improved foto score from 60 to 70  Pt demonstrate no difficulty with tasks like stair navigation  Pt demonstrate ability to walk 1 mile pain-free     Plan     Continue with Plan Of Care and progress toward therapist goals.    Kirill Juarez, PT

## 2024-01-24 NOTE — PROGRESS NOTES
[facility]   Physical Therapy Treatment Note      Name: Nilesh Almaraz  [facility] Number: [id number]    Therapy Diagnosis:   No diagnosis found.    Physician: Khang Pedroza*    Visit Date: 1/25/2024      Physician Orders: PT Eval and Treat   Medical Diagnosis from Referral: Acute right ankle pain [M25.571], Achilles tendinitis of right lower extremity [M76.61]   Evaluation Date: 1/5/2024  Authorization Period Expiration: 12/28/2024  Plan of Care Expiration: 4/5/2024  Progress Note Due: 2/5/2024  Date of Surgery: na   Visit # / Visits authorized: 1/ 1, 3/20   FOTO: 2/ 3     Precautions: Standard, pmh prostate  CA (remission >1 year)     Time In: ***   Time Out: ***   Total Billable Time: *** minutes 1-1 with PT' remainder time with tech     PTA Visit #: 1/5       Subjective     Patient reports: he is making progress. He can walk without a limp. He reports the pain changes bases off of the shoe.   He was compliant with home exercise program.  Response to previous treatment: ongoing   Functional change: ongoing     Pain: 2/10  Location: right ankle   C/O: Right achilles pain with walking, duration 2 months     Objective      Objective Measures updated at progress report unless specified.     Treatment     Art received the treatments listed below:         therapeutic exercises to develop strength, endurance, ROM, flexibility, posture, and core stabilization for 40 minutes including:     Ankle ISO 6s x 6 EVER/INV blue   Gastroc stretch in standing 3 x 45s (gentle)  standing great toe stretch 3 x 20s  therabar   Standing extensor stretching 45s x 3   Heel raises eccentric 3 x 10  Standing quad stretch 5 x 10 seconds  Hamstring stretch with strap 10 x 10 seconds    Next visit consider:   Progressive gastroc eccentrics   Ice massage       Manual therapy: 15 minutes   IASTM R achilles           Patient Education and Home Exercises       Education provided:   HEP review     Written Home  Exercises Provided: yes. Exercises were reviewed and Art was able to demonstrate them prior to the end of the session.  Art demonstrated good  understanding of the education provided. See Electronic Medical Record under Patient Instructions for exercises provided during therapy sessions    Assessment     Pt reports positive carry over between visits with decreased right achilles pain with walking. Manual therapy performed today to improve pain levels. Pt requires minimal cues for proper mechanics with exercises. Plan to progress as tolerated.     Art Is progressing well towards his goals.   Patient prognosis is Excellent.     Patient will continue to benefit from skilled outpatient physical therapy to address the deficits listed in the problem list box on initial evaluation, provide pt/family education and to maximize pt's level of independence in the home and community environment.     Patient's spiritual, cultural and educational needs considered and pt agreeable to plan of care and goals.     Anticipated barriers to physical therapy: none.    Goals:  Short Term Goals: 4 weeks   Pt demonstrate indep with initial hep   Pt demonstrate normalized gait pattern without pain     Long Term Goals: 12 weeks   Pt demonstrate improved foto score from 60 to 70  Pt demonstrate no difficulty with tasks like stair navigation  Pt demonstrate ability to walk 1 mile pain-free     Plan     Continue with Plan Of Care and progress toward therapist goals.    Lidia Garcia, PT

## 2024-01-25 ENCOUNTER — CLINICAL SUPPORT (OUTPATIENT)
Dept: REHABILITATION | Facility: HOSPITAL | Age: 74
End: 2024-01-25
Payer: MEDICARE

## 2024-01-25 DIAGNOSIS — R26.2 DIFFICULTY WALKING: Primary | ICD-10-CM

## 2024-01-25 PROCEDURE — 97110 THERAPEUTIC EXERCISES: CPT | Mod: PO

## 2024-01-25 PROCEDURE — 97140 MANUAL THERAPY 1/> REGIONS: CPT | Mod: PO

## 2024-01-26 ENCOUNTER — OFFICE VISIT (OUTPATIENT)
Dept: INTERNAL MEDICINE | Facility: CLINIC | Age: 74
End: 2024-01-26
Payer: MEDICARE

## 2024-01-26 ENCOUNTER — OFFICE VISIT (OUTPATIENT)
Dept: SPORTS MEDICINE | Facility: CLINIC | Age: 74
End: 2024-01-26
Payer: MEDICARE

## 2024-01-26 VITALS
SYSTOLIC BLOOD PRESSURE: 106 MMHG | DIASTOLIC BLOOD PRESSURE: 69 MMHG | HEIGHT: 72 IN | HEART RATE: 53 BPM | BODY MASS INDEX: 23.89 KG/M2 | WEIGHT: 176.38 LBS

## 2024-01-26 VITALS
HEART RATE: 50 BPM | DIASTOLIC BLOOD PRESSURE: 60 MMHG | WEIGHT: 179.38 LBS | OXYGEN SATURATION: 98 % | BODY MASS INDEX: 24.3 KG/M2 | SYSTOLIC BLOOD PRESSURE: 118 MMHG | HEIGHT: 72 IN

## 2024-01-26 DIAGNOSIS — G89.29 CHRONIC LEFT SHOULDER PAIN: ICD-10-CM

## 2024-01-26 DIAGNOSIS — M76.61 ACHILLES TENDINITIS OF RIGHT LOWER EXTREMITY: ICD-10-CM

## 2024-01-26 DIAGNOSIS — R73.03 PREDIABETES: ICD-10-CM

## 2024-01-26 DIAGNOSIS — M25.571 ACUTE RIGHT ANKLE PAIN: Primary | ICD-10-CM

## 2024-01-26 DIAGNOSIS — K64.9 HEMORRHOIDS, UNSPECIFIED HEMORRHOID TYPE: ICD-10-CM

## 2024-01-26 DIAGNOSIS — C61 PROSTATE CANCER: Primary | ICD-10-CM

## 2024-01-26 DIAGNOSIS — E78.5 DYSLIPIDEMIA: ICD-10-CM

## 2024-01-26 DIAGNOSIS — M25.512 CHRONIC LEFT SHOULDER PAIN: ICD-10-CM

## 2024-01-26 PROBLEM — E78.00 HYPERCHOLESTEREMIA: Status: RESOLVED | Noted: 2022-12-13 | Resolved: 2024-01-26

## 2024-01-26 PROCEDURE — 99215 OFFICE O/P EST HI 40 MIN: CPT | Mod: S$PBB,,, | Performed by: INTERNAL MEDICINE

## 2024-01-26 PROCEDURE — 99213 OFFICE O/P EST LOW 20 MIN: CPT | Mod: S$PBB,,, | Performed by: ORTHOPAEDIC SURGERY

## 2024-01-26 PROCEDURE — 99213 OFFICE O/P EST LOW 20 MIN: CPT | Mod: PBBFAC,27,PO | Performed by: INTERNAL MEDICINE

## 2024-01-26 PROCEDURE — 99213 OFFICE O/P EST LOW 20 MIN: CPT | Mod: PBBFAC,27 | Performed by: ORTHOPAEDIC SURGERY

## 2024-01-26 PROCEDURE — 99999 PR PBB SHADOW E&M-EST. PATIENT-LVL III: CPT | Mod: PBBFAC,,, | Performed by: ORTHOPAEDIC SURGERY

## 2024-01-26 PROCEDURE — 99999 PR PBB SHADOW E&M-EST. PATIENT-LVL III: CPT | Mod: PBBFAC,,, | Performed by: INTERNAL MEDICINE

## 2024-01-26 RX ORDER — MELOXICAM 15 MG/1
15 TABLET ORAL DAILY
Qty: 30 TABLET | Refills: 3 | Status: SHIPPED | OUTPATIENT
Start: 2024-01-26 | End: 2024-04-05 | Stop reason: SDUPTHER

## 2024-01-26 RX ORDER — PREDNISONE 20 MG/1
TABLET ORAL
Qty: 20 TABLET | Refills: 0 | Status: SHIPPED | OUTPATIENT
Start: 2024-01-26

## 2024-01-26 NOTE — PROGRESS NOTES
Subjective     Patient ID: Nilesh Almaraz is a 73 y.o. male.    Chief Complaint: Annual Exam    HPI  73 y.o. Male here for annual exam.      Vaccines: Influenza (2023); Tetanus (2017); Prevnar 13 (2017); Pneumococcal (2018); Prevnar 20 (2023); Shingrix (2020)  Eye exam: 2018  Colonoscopy: 3/23     Exercise: cardio 2-3x/wk  Diet: regular     Past Medical History:  No date: Allergy  No date: Anxiety  No date: BPH (benign prostatic hypertrophy)  No date: Prediabetes  3/8/2023: Primary osteoarthritis of first carpometacarpal joint of   left hand  No date: Prostate cancer  No date: Prostatism  No date: Skin cancer  Past Surgical History:  3/23/2023: COLONOSCOPY; N/A      Comment:  Procedure: COLONOSCOPY;  Surgeon: Conor Reid MD;                Location: [facility] ([address] [place] [address]);  Service: Endoscopy;                 Laterality: N/A;  suprep-inst portal-tbpre call done,                pt requesting earlier time on day of if one becomes                available- AJ3/21-updated instructions sent to                [facility], pt contacted for earlier arrival time-KPvt  No date: FRACTURE SURGERY      Comment:  left ankle  No date: HERNIA REPAIR  No date: hydrocele  No date: SKIN LESION EXCISION  Social History    Socioeconomic History      Marital status:       Spouse name: Palmira      Number of children: 2    Occupational History      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iest        Employer: [employer]    Tobacco Use      Smoking status: Never      Smokeless tobacco: Never    Substance and Sexual Activity      Alcohol use: Yes        Alcohol/week: 2.0 standard drinks of alcohol        Types: 2 Glasses of wine per week        Comment: daily      Drug use: No      Sexual activity: Yes        Partners: Female    Social Determinants of Health  Financial Resource Strain: Low Risk  (1/26/2024)      Overall Financial Resource Strain (CARDIA)          Difficulty of Paying Living Expenses: Not hard at all  Food Insecurity: No Food Insecurity  (1/26/2024)      Hunger Vital Sign          Worried About Running Out of Food in the Last Year: Never true          Ran Out of Food in the Last Year: Never true  Transportation Needs: No Transportation Needs (1/26/2024)      PRAPARE - Transportation          Lack of Transportation (Medical): No          Lack of Transportation (Non-Medical): No  Physical Activity: Sufficiently Active (1/26/2024)      Exercise Vital Sign          Days of Exercise per Week: 3 days          Minutes of Exercise per Session: 60 min  Recent Concern: Physical Activity - Insufficiently Active (12/28/2023)      Exercise Vital Sign          Days of Exercise per Week: 2 days          Minutes of Exercise per Session: 60 min  Stress: Stress Concern Present (1/26/2024)      [employer] [place] [employer] - Occupational Stress Questionnaire          Feeling of Stress : To some extent  Social Connections: Unknown (1/26/2024)      Social Connection and Isolation Panel [NHANES]          Frequency of Communication with Friends and Family: Once a week          Frequency of Social Gatherings with Friends and Family: Once a week          Active Member of Clubs or Organizations: Yes          Attends Club or Organization Meetings: More than 4 times per year          Marital Status:   Housing Stability: Low Risk  (1/26/2024)      Housing Stability Vital Sign          Unable to Pay for Housing in the Last Year: No          Number of Places Lived in the Last Year: 2          Unstable Housing in the Last Year: No  Review of patient's allergies indicates:   -- Penicillins     --  Other reaction(s): Hives             Patient received rocephin with no complications  Art SHAJI Almaraz had no medications administered during this visit.  Review of Systems   Constitutional:  Negative for activity change, appetite change, chills, diaphoresis, fatigue, fever and unexpected weight change.   HENT:  Negative for nasal congestion, mouth sores, postnasal drip,  rhinorrhea, sinus pressure/congestion, sneezing, sore throat, trouble swallowing and voice change.    Eyes:  Negative for discharge, itching and visual disturbance.   Respiratory:  Negative for cough, chest tightness, shortness of breath and wheezing.    Cardiovascular:  Negative for chest pain, palpitations and leg swelling.   Gastrointestinal:  Negative for abdominal pain, blood in stool, constipation, diarrhea, nausea and vomiting.   Endocrine: Negative for cold intolerance and heat intolerance.   Genitourinary:  Negative for difficulty urinating, dysuria, flank pain, hematuria and urgency.   Musculoskeletal:  Negative for arthralgias, back pain, myalgias and neck pain.   Integumentary:  Negative for rash and wound.   Allergic/Immunologic: Negative for environmental allergies and food allergies.   Neurological:  Negative for dizziness, tremors, seizures, syncope, weakness and headaches.   Hematological:  Negative for adenopathy. Does not bruise/bleed easily.   Psychiatric/Behavioral:  Negative for confusion, sleep disturbance and suicidal ideas. The patient is not nervous/anxious.           Objective     Physical Exam  Constitutional:       General: He is not in acute distress.     Appearance: Normal appearance. He is well-developed. He is not ill-appearing, toxic-appearing or diaphoretic.   HENT:      Head: Normocephalic and atraumatic.      Right Ear: External ear normal.      Left Ear: External ear normal.      Nose: Nose normal.      Mouth/Throat:      Pharynx: No oropharyngeal exudate.   Eyes:      General: No scleral icterus.        Right eye: No discharge.         Left eye: No discharge.      Extraocular Movements: Extraocular movements intact.      Conjunctiva/sclera: Conjunctivae normal.      Pupils: Pupils are equal, round, and reactive to light.   Neck:      Thyroid: No thyromegaly.      Vascular: No JVD.   Cardiovascular:      Rate and Rhythm: Normal rate and regular rhythm.      Pulses: Normal  pulses.      Heart sounds: Normal heart sounds. No murmur heard.  Pulmonary:      Effort: Pulmonary effort is normal. No respiratory distress.      Breath sounds: Normal breath sounds. No wheezing or rales.   Abdominal:      General: Bowel sounds are normal. There is no distension.      Palpations: Abdomen is soft.      Tenderness: There is no abdominal tenderness. There is no right CVA tenderness, left CVA tenderness, guarding or rebound.   Musculoskeletal:      Cervical back: Normal range of motion and neck supple. No rigidity.      Right lower leg: No edema.      Left lower leg: No edema.        Feet:    Lymphadenopathy:      Cervical: No cervical adenopathy.   Skin:     General: Skin is warm and dry.      Capillary Refill: Capillary refill takes less than 2 seconds.      Coloration: Skin is not pale.      Findings: No rash.   Neurological:      General: No focal deficit present.      Mental Status: He is alert and oriented to person, place, and time. Mental status is at baseline.      Cranial Nerves: No cranial nerve deficit.      Sensory: No sensory deficit.      Motor: No weakness.      Coordination: Coordination normal.      Gait: Gait normal.      Deep Tendon Reflexes: Reflexes normal.   Psychiatric:         Mood and Affect: Mood normal.         Behavior: Behavior normal.         Thought Content: Thought content normal.         Judgment: Judgment normal.          Assessment and Plan     1. Prostate cancer    2. Hemorrhoids, unspecified hemorrhoid type    3. Chronic left shoulder pain    4. Prediabetes  -     CBC Auto Differential; Future; Expected date: 01/26/2024  -     Comprehensive Metabolic Panel; Future; Expected date: 01/26/2024  -     TSH; Future; Expected date: 01/26/2024  -     Lipid Panel; Future; Expected date: 01/26/2024  -     Urinalysis; Future    5. Achilles tendinitis of right lower extremity  -     predniSONE (DELTASONE) 20 MG tablet; Take 3 pills daily for 3 days, then 2 pills daily for 3  days, then 1 pill daily for 3 days, then 1/2 pill daily for 4 days.  Dispense: 20 tablet; Refill: 0  -     meloxicam (MOBIC) 15 MG tablet; Take 1 tablet (15 mg total) by mouth once daily.  Dispense: 30 tablet; Refill: 3    6. Dyslipidemia  -     Lipid Panel; Future; Expected date: 01/26/2024         Hemorrhoids- stable      Prostate cancer- s/p XRT      Prediabetes- trend HA1C      Right achilles tendinitis- Rx Prednisone taper and then start Mobic 15 mg qd   -f/u with Ortho      F/u in 1 yr     Over 1/2 of 40 minute visit spent reviewing pt's medical records, education/discussion of pt's medical conditions and medical management

## 2024-01-26 NOTE — PROGRESS NOTES
Subjective:     Chief Complaint: Nilesh Almaraz is a 73 y.o. male who had concerns including Pain of the Right Lower Leg.    HPI    Patient presents today for follow-up of acute atraumatic right ankle pain.  Patient was given a referral to physical therapy after his last appointment, for which she has been attending for the past month.  Patient believes he is making some progress with this, albeit very slowly.  He has continued to refrain from any walking or extensive exercise other than lower extremity stretching.  He expresses frustration that the pain has not completely subsided and feels totally unchanged since initial visit.  Pain is still localized immediately deep to the Achilles tendon and made worse with walking.  He rates pain as 4/10 today.    Interval history 12/29/2023:  Patient who is an avid walker presents to clinic with acute right ankle pain x 6 weeks. Patient states the pain began following completion of a 3 mi hike out of state.  He denies any MIRIAM or traumatic event.  Pain is localized along the Achilles tendon approximately 6 in proximal to the calcaneal insertion.  He rates the pain as 6/10 and made worse with walking and plantar flexion.  He has attempted multiple conservative measures that include activity modification, ice & elevation, and oral medications (ibuprofen), with little relief.  Is affecting ADLs and limiting desired level of activity, as he would like to get back to hiking pain-free. Denies numbness, tingling, radiation, and inability to bear weight. He is here today to discuss treatment options.    No previous surgeries or trauma bilateral ankle      Review of Systems   Constitutional: Negative.   HENT: Negative.     Eyes: Negative.    Cardiovascular: Negative.    Respiratory: Negative.     Endocrine: Negative.    Hematologic/Lymphatic: Negative.    Skin: Negative.    Musculoskeletal:  Positive for myalgias. Negative for falls, joint pain, joint swelling, muscle weakness and  stiffness.   Neurological: Negative.    Psychiatric/Behavioral: Negative.     Allergic/Immunologic: Negative.        Pain Related Questions  Over the past 3 days, what was your highest pain level?: 4  Over the past 3 days, what was your lowest pain level? : 3    Other  How many nights a week are you awakened by your affected body part?: 0  Was the patient's HEIGHT measured or patient reported?: Patient Reported  Was the patient's WEIGHT measured or patient reported?: Measured    Objective:     General: Nilseh is well-developed, well-nourished, appears stated age, in no acute distress, alert and oriented to time, place and person.     General    Vitals reviewed.  Constitutional: He is oriented to person, place, and time. He appears well-developed and well-nourished. No distress.   HENT:   Head: Normocephalic and atraumatic.   Eyes: EOM are normal.   Cardiovascular:  Normal rate.            Pulmonary/Chest: Effort normal.   Neurological: He is alert and oriented to person, place, and time. He has normal reflexes. Coordination normal.   Psychiatric: He has a normal mood and affect. His behavior is normal. Judgment and thought content normal.     General Musculoskeletal Exam   Gait: normal     Right Ankle/Foot Exam   Right ankle exam is normal.    Inspection   Scars: absent  Deformity: absent  Erythema: absent  Bruising: Ankle - absent Foot - absent  Effusion: Ankle - absent Foot - absent  Atrophy: Ankle - absent Foot - absent    Tenderness   The patient is tender to palpation of the Achilles tendon.    Pain   The patient exhibits pain of the Achilles tendon.    Range of Motion   The patient has normal right ankle ROM.  Ankle Joint   Dorsiflexion:  20 normal   Plantar flexion:  50 normal   Subtalar Joint   Inversion:  50 normal   Eversion:  30 normal   Hughes Test:  negative  First MTP Joint: normal    Alignment   Knee Alignment: neutral  Midfoot Alignment: normal  Forefoot Alignment: normal    Tests   Anterior  drawer: negative  Varus tilt: negative  Heel Walk: able to perform  Tiptoe Walk: able to perform  Single Heel Rise: able to perform  External Rotation Test: negative  Squeeze Test: negative    Other   Sensation: normal  Peroneal Subluxation: negative    Left Ankle/Foot Exam   Left ankle exam is normal.    Inspection  Deformity: absent  Erythema: absent  Bruising: Ankle - absent Foot - absent  Effusion: Ankle - absent Foot - absent  Atrophy: Ankle - absent Foot - absent  Scars: absent    Range of Motion   The patient has normal left ankle ROM.   Ankle Joint  Dorsiflexion:  20 normal   Plantar flexion:  50 normal     Subtalar Joint   Inversion:  50 normal   Eversion:  30 normal   Hughes Test:  normal  First MTP Joint: normal    Alignment   Knee Alignment: neutral  Midfoot Alignment: normal  Forefoot Alignment: normal    Tests   Anterior drawer: negative  Varus tilt: negative  Heel Walk: able to perform  Tiptoe Walk: able to perform  Single Heel Rise: able to perform  External Rotation Test: negative  Squeeze Test: absent    Other   Sensation: normal  Peroneal Subluxation: negative      Muscle Strength   Right Lower Extremity   Anterior tibial:  5/5   Posterior tibial:  5/5   Gastrocsoleus:  5/5   Peroneal muscle:  5/5   EHL:  5/5  FDL: 5/5  EDL: 5/5  FHL: 5/5  Left Lower Extremity   Anterior tibial:  5/5   Posterior tibial:  5/5   Gastrocsoleus:  5/5   Peroneal muscle:  5/5   EHL:  5/5  FDL: 5/5  EDL: 5/5  FHL: 5/5    Reflexes     Left Side  Achilles:  2+  Babinski Sign:  absent  Ankle Clonus:  absent    Right Side   Achilles:  2+  Babinski Sign:  absent  Ankle Clonus:  absent    Vascular Exam     Right Pulses  Dorsalis Pedis:      2+  Posterior Tibial:      2+        Left Pulses  Dorsalis Pedis:      2+  Posterior Tibial:      2+        Radiographs right ankle     My interpretation:    No signs of fracture, contusion, swelling, DJD, or any other bony abnormalities noted.        Assessment:     Encounter Diagnoses    Name Primary?    Acute right ankle pain Yes    Achilles tendinitis of right lower extremity           Plan:     1. We again discussed at length different treatment options including conservative vs surgical management. These include anti-inflammatories, acetaminophen, rest, ice, heat, formal physical therapy including strengthening and stretching exercises, home exercise programs, dry needling, and finally surgical intervention.  Given the fact he has not made satisfactory progress with physical therapy we discussed multiple treatment options moving forward.  This would include continuing PT as well as obtaining an MRI to rule out any soft tissue pathology.  We also briefly discussed seeing a foot and ankle specialist.  At this time, patient declined an MRI would like to speak with another provider for 2nd opinion.    2. May continue formal physical therapy at [facility] with focus on right ankle.    3. Referral placed to see Alen Ramírez MD to address right ankle pain.    4.Ice compress to the affected area 2-3x a day for 15-20 minutes as needed for pain management.  Refrain from any hiking or high impact activities.    5. RTC to see Benji powell.  Patient will contact our clinic if he would like to return to physical therapy or discuss alternative treatment options.      All of the patient's questions were answered. Patient was advised to call the clinic or contact me through the patient portal for any questions or concerns.       Medical Dictation software was used during the dictation of portions or the entirety of this medical record.  Phonetic or grammatic errors may exist due to the use of this software. For clarification, refer to the author of the document.        Patient questionnaires may have been collected.

## 2024-02-06 ENCOUNTER — CLINICAL SUPPORT (OUTPATIENT)
Dept: REHABILITATION | Facility: HOSPITAL | Age: 74
End: 2024-02-06
Payer: MEDICARE

## 2024-02-06 DIAGNOSIS — R26.2 DIFFICULTY WALKING: Primary | ICD-10-CM

## 2024-02-06 PROCEDURE — 97110 THERAPEUTIC EXERCISES: CPT | Mod: PO

## 2024-02-06 PROCEDURE — 97140 MANUAL THERAPY 1/> REGIONS: CPT | Mod: PO

## 2024-02-06 NOTE — PROGRESS NOTES
[facility]   Physical Therapy Treatment Note / Progress note      Name: Nilesh Almaraz  Clinic Number: [id number]    Therapy Diagnosis:   Encounter Diagnosis   Name Primary?    Difficulty walking Yes       Physician: Khang Pedroza*    Visit Date: 2/6/2024      Physician Orders: PT Eval and Treat   Medical Diagnosis from Referral: Acute right ankle pain [M25.571], Achilles tendinitis of right lower extremity [M76.61]   Evaluation Date: 1/5/2024  Authorization Period Expiration: 12/31/2024  Plan of Care Expiration: 4/5/2024  Progress Note Due: 3/6/2024  Date of Surgery: na   Visit # / Visits authorized: 4/20  FOTO: 2/ 3     Precautions: Standard, pmh prostate  CA (remission >1 year)     Time In: 8:02 am   Time Out: 8:45 am  Total Billable Time: 43 minutes     PTA Visit #: 1/5       Subjective     Patient reports: overall he feels 30 percent improvement of his ankle pain. He tried prednisone recently but it did not help. He is going to [facility] Thursday and plans on doing some hiking.   He was compliant with home exercise program.  Response to previous treatment:ongoing   Functional change: ongoing     Pain: 3/10 when walking, 0/10 at rest   Location: right foot/ ankle       Objective      Objective Measures updated at progress report unless specified.     UPDATED 2/6/2024     GAIT   WNL      ANKLE AROM in deg   LEFT (not tested)  DF 20   PF 50  IN 40  EV 5 (reports previous lateral ankle surgery 2007)     RIGHT   DF 20  PF 50  INV 40  EVER 30     ANKLE MMT (not tested)  LEFT 5/5 all directions      RIGHT ANKLE MMT   5/5 all directions      PALPATION   Mild ttp watershed zone R achilles   Mild increased achilles tone      MOBILITY R and L   TC   Anterior 3/6; post 2/6      Subtalar 3/6 all directions (not tested)     MLA   Short gastroc/soleus and extensor muscles            Intake Outcome Measure for FOTO LE Survey     Therapist reviewed FOTO scores for Nilesh Almaraz on  2/6/2024.   FOTO report - see Media section or FOTO account episode details.     Intake Score: 57%           Treatment     Art received the treatments listed below:         therapeutic exercises to develop strength, endurance, ROM, flexibility, posture, and core stabilization for 30 minutes including:     STM plantar fascia with lax ball 2 minutes   Great toe stretch on therabar 45 sec x 3 (standing)  Objective testing   Eccentric heel raise DL 2 x 10 (at step)      Manual therapy: 13 minutes   STM R achilles     TIME + ICE 6 minutes, R achilles     Patient Education and Home Exercises       Education provided:   HEP review   Benefits of ice     Written Home Exercises Provided: Patient instructed to cont prior HEP. Exercises were reviewed and Art was able to demonstrate them prior to the end of the session.  Art demonstrated good  understanding of the education provided. See Electronic Medical Record under Patient Instructions for exercises provided during therapy sessions    Assessment     At progress note, pt has completed approximately 4 visits of PT. Pt is making slow but positive progress toward his goals. Pt reports 30 percent improvement of symptoms of right achilles pain. Overall gait mechanics are normalized. Pt still reporting mild right achilles pain with ADLs like walking. Pt will continue to benefit from skilled PT for return to walking without limitations as well as education to establish indep with his poc.     Art Is progressing well towards his goals.   Patient prognosis is Excellent.     Patient will continue to benefit from skilled outpatient physical therapy to address the deficits listed in the problem list box on initial evaluation, provide pt/family education and to maximize pt's level of independence in the home and community environment.     Patient's spiritual, cultural and educational needs considered and pt agreeable to plan of care and goals.     Anticipated barriers to physical therapy:  none.    Goals: progressing  Short Term Goals: 4 weeks   Pt demonstrate indep with initial hep - met  Pt demonstrate normalized gait pattern without pain met      Long Term Goals: 12 weeks   Pt demonstrate improved foto score from 60 to 70 ONGOING (57)  Pt demonstrate no difficulty with tasks like stair navigation met   Pt demonstrate ability to walk 1 mile pain-free  ONGOING     Plan     Continue with Plan Of Care and progress toward therapist goals.    Kirill Juarez, PT

## 2024-02-15 ENCOUNTER — PATIENT MESSAGE (OUTPATIENT)
Dept: SPORTS MEDICINE | Facility: CLINIC | Age: 74
End: 2024-02-15
Payer: MEDICARE

## 2024-02-15 ENCOUNTER — PATIENT MESSAGE (OUTPATIENT)
Dept: INTERNAL MEDICINE | Facility: CLINIC | Age: 74
End: 2024-02-15
Payer: MEDICARE

## 2024-02-15 ENCOUNTER — TELEPHONE (OUTPATIENT)
Dept: INTERNAL MEDICINE | Facility: CLINIC | Age: 74
End: 2024-02-15
Payer: MEDICARE

## 2024-02-15 DIAGNOSIS — M76.60 ACHILLES TENDINITIS, UNSPECIFIED LATERALITY: Primary | ICD-10-CM

## 2024-03-04 ENCOUNTER — OFFICE VISIT (OUTPATIENT)
Dept: ORTHOPEDICS | Facility: CLINIC | Age: 74
End: 2024-03-04
Payer: MEDICARE

## 2024-03-04 VITALS — HEIGHT: 72 IN | BODY MASS INDEX: 24.3 KG/M2 | WEIGHT: 179.44 LBS

## 2024-03-04 DIAGNOSIS — M25.571 PAIN IN JOINT INVOLVING RIGHT ANKLE AND FOOT: Primary | ICD-10-CM

## 2024-03-04 DIAGNOSIS — M76.60 ACHILLES TENDINITIS, UNSPECIFIED LATERALITY: ICD-10-CM

## 2024-03-04 PROCEDURE — 99213 OFFICE O/P EST LOW 20 MIN: CPT | Mod: S$PBB,,, | Performed by: SURGERY

## 2024-03-04 PROCEDURE — 99999 PR PBB SHADOW E&M-EST. PATIENT-LVL III: CPT | Mod: PBBFAC,,, | Performed by: SURGERY

## 2024-03-04 PROCEDURE — 99213 OFFICE O/P EST LOW 20 MIN: CPT | Mod: PBBFAC,PN | Performed by: SURGERY

## 2024-03-04 NOTE — PROGRESS NOTES
Nilesh Almaraz is 73 y.o. male here today for right posterior heel pain.  He reports that pain has been going on for 4 months. The pain is described as located at the posterior aspect of the heel, worse with any activity.  He has an avid hiker and outdoor person, he also likes to bike.  Pain is worse with prolonged activity.  States he can only walk a quarter of a mi at this point without pain.  He has been seen by Physician Associate Yovany and referred to us for another opinion. Presents for further consultation.    General appearance  This is a well-developed, Well nourished male No obvious acute distress.  Orientation: Patient is alert and oriented x4  Mood and affect: Normal, pleasant and conversant  Gait is coordinated. Patient has antalgic gait to the right    Cardiovascular: There are no swelling or varicosities present.     Coordination and Balance: Normal   Musculoskeletal     RLE    NEG patricks test hips       ROM shows normal flexion, extension of hip and knee    Palpation shows no masses    Knee stability is normal;-JLT medial,  -mcmurrays    Strength to flexion and extension well maintained    Skin shows no rashes, lesions or cafe au lait spots    Sensation intact to light touch    Heel shows swelling; neutral in extension,-5 degrees in flexion. Mild Achilles contracture.   LLE    NEG patricks test hips    ROM shows normal flexion, extension of hip; knee shows good  ROM    Ankle shows pain medially and laterally; 0-45 degrees motion    Palpation shows no masses; tenderness medially and lateral ankle    Knee stability is normal; -JLTmedial, -mcmurrays    Strength to flexion and extension well maintained    Skin shows no rashes, lesions or cafe au lait spots    Sensation intact to light touch    Foot is normal/plantigrade,     An AP, oblique, and lateral view of right foot  was obtained and reviewed  There is no evidence of fracture or dislocation in the foot.  There is not insertional   osteophyte formation  The joint spaces appear relatively well preserved.    Assessment  right achilles tendonitis    Plan   I had a lengthy discussion with the patient today regarding clinical history, imaging findings, and physical exam.  I discussed insertional versus non-insertional Achilles tendon pathology. I emphasized role for nonsurgical treatment modalities, notably eccentric stretching, anti-inflammatory medications, ice and other modalities.  I strongly advised against any injections around the achilles tendon or its insertion due to risk of rupture.  Given propensity for symptoms to resolve with treatment as above, I discussed with patient that surgery is reserved for refractory symptoms lasting at least 6 months despite adequate treatment as above.    -Boot immobilization for 4-6 weeks  -MRI ordered to evaluate the right Achilles  -follow up in 4-6 weeks

## 2024-03-07 ENCOUNTER — HOSPITAL ENCOUNTER (OUTPATIENT)
Dept: RADIOLOGY | Facility: HOSPITAL | Age: 74
Discharge: HOME | End: 2024-03-07
Attending: SURGERY
Payer: MEDICARE

## 2024-03-07 DIAGNOSIS — M25.571 PAIN IN JOINT INVOLVING RIGHT ANKLE AND FOOT: ICD-10-CM

## 2024-03-07 PROCEDURE — 73721 MRI JNT OF LWR EXTRE W/O DYE: CPT | Mod: 26,RT,, | Performed by: RADIOLOGY

## 2024-03-07 PROCEDURE — 73721 MRI JNT OF LWR EXTRE W/O DYE: CPT | Mod: TC,RT

## 2024-03-11 ENCOUNTER — OFFICE VISIT (OUTPATIENT)
Dept: ORTHOPEDICS | Facility: CLINIC | Age: 74
End: 2024-03-11
Payer: MEDICARE

## 2024-03-11 DIAGNOSIS — M67.873 ACHILLES TENDINOSIS OF RIGHT ANKLE: Primary | ICD-10-CM

## 2024-03-11 PROCEDURE — 99999 PR PBB SHADOW E&M-EST. PATIENT-LVL II: CPT | Mod: PBBFAC,,, | Performed by: SURGERY

## 2024-03-11 PROCEDURE — 99212 OFFICE O/P EST SF 10 MIN: CPT | Mod: PBBFAC,PN | Performed by: SURGERY

## 2024-03-11 PROCEDURE — 99213 OFFICE O/P EST LOW 20 MIN: CPT | Mod: S$PBB,,, | Performed by: SURGERY

## 2024-03-12 NOTE — PROGRESS NOTES
SUBJECTIVE:    Mr. Almaraz is here today for a follow up visit.  He presents for the results of his MRI and further counseling regarding his Achilles tendinosis of the right lower extremity.  He states that while wearing the boot he has felt some improvement in his pain, however he still has moderate pain.        OBJECTIVE:      There were no vitals filed for this visit.    Lower Extremity Exam  Tenderness to palpation about the distal aspect of the Achilles, just proximal to the insertion.  Power 5/5 to the Achilles.  Mild Edson's deformity.  Neurovascularly intact.     DIAGNOSTIC STUDIES:  MRI reviewed and independently interpreted by me.  Non insertional Achilles tendinosis extending for length of 6.2 cm with early interstitial split (2 cm in length) as above   The integrity of the Achilles appears mostly intact despite the interstitial tear.  Also notable FHL tendinosis and edema.    ASSESSMENT:   1. 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    I explained his diagnosis and my treatment algorithm.  We discussed numerous questions that he brought in a piece of paper.  He records shipped to [facility] where he is going to be for the next 1-2 months and then he will follow up with us.  He will also make a follow up appointment there.  I suggested multiple surgeons for him to follow up with in [place].  We will help him obtain his records and him at his next follow up.  I advised continuing the boot for 4-6 weeks.  After which we will start physical therapy again.  Should he fail physical therapy, immobilization, and medications, we will consider surgery.    Alen Jacobson MD  [facility]  Orthopedic Surgery      This note was done with voice recognition software. Please excuse any errors missed in proof reading.

## 2024-03-18 RX ORDER — TAMSULOSIN HYDROCHLORIDE 0.4 MG/1
0.8 CAPSULE ORAL DAILY
Qty: 90 CAPSULE | Refills: 3 | Status: SHIPPED | OUTPATIENT
Start: 2024-03-18 | End: 2024-04-05 | Stop reason: SDUPTHER

## 2024-03-21 ENCOUNTER — TELEPHONE (OUTPATIENT)
Dept: INTERNAL MEDICINE | Facility: CLINIC | Age: 74
End: 2024-03-21
Payer: MEDICARE

## 2024-03-21 DIAGNOSIS — H91.90 HEARING LOSS, UNSPECIFIED HEARING LOSS TYPE, UNSPECIFIED LATERALITY: Primary | ICD-10-CM

## 2024-03-21 NOTE — TELEPHONE ENCOUNTER
----- Message from Nay Merida MA sent at 3/21/2024  2:29 PM CDT -----  Contact: Ene @[facility] [phone]    ----- Message -----  From: Sharyn Cottrell  Sent: 3/21/2024   1:14 PM CDT  To: More [facility] Staff    Would like to receive medical advice.    Would they like a call back or a response via [facility]:  call back    Additional information:  Calling to request a referral for audiology and sent to [facility]. Fax number is [phone] and reason is hearing loss and dated with date 3/21/2024 or earlier.

## 2024-04-05 DIAGNOSIS — M76.61 ACHILLES TENDINITIS OF RIGHT LOWER EXTREMITY: ICD-10-CM

## 2024-04-05 RX ORDER — MELOXICAM 15 MG/1
15 TABLET ORAL DAILY
Qty: 30 TABLET | Refills: 3 | Status: SHIPPED | OUTPATIENT
Start: 2024-04-05

## 2024-04-05 NOTE — TELEPHONE ENCOUNTER
Care Due:                  Date            Visit Type   Department     Provider  --------------------------------------------------------------------------------                                EP -                              PRIMARY      MET INTERNAL  Last Visit: 01-      CARE ([facility])   MEDICINE       Ke Aguero  Next Visit: None Scheduled  None         None Found                                                            Last  Test          Frequency    Reason                     Performed    Due Date  --------------------------------------------------------------------------------    CBC.........  12 months..  meloxicam................  02- 02-    CMP.........  12 months..  meloxicam................  02- 02-    [employer] Embedded Care Due Messages. Reference number: [id number].   4/05/2024 10:32:39 AM CDT

## 2024-04-08 RX ORDER — TAMSULOSIN HYDROCHLORIDE 0.4 MG/1
0.8 CAPSULE ORAL DAILY
Qty: 90 CAPSULE | Refills: 3 | Status: SHIPPED | OUTPATIENT
Start: 2024-04-08

## 2024-05-06 ENCOUNTER — OFFICE VISIT (OUTPATIENT)
Dept: ORTHOPEDICS | Facility: CLINIC | Age: 74
End: 2024-05-06
Payer: MEDICARE

## 2024-05-06 VITALS — HEIGHT: 72 IN | BODY MASS INDEX: 24.3 KG/M2 | WEIGHT: 179.44 LBS

## 2024-05-06 DIAGNOSIS — M67.873 ACHILLES TENDINOSIS OF RIGHT ANKLE: Primary | ICD-10-CM

## 2024-05-06 PROCEDURE — 99214 OFFICE O/P EST MOD 30 MIN: CPT | Mod: S$PBB,,, | Performed by: SURGERY

## 2024-05-06 PROCEDURE — 99999 PR PBB SHADOW E&M-EST. PATIENT-LVL III: CPT | Mod: PBBFAC,,, | Performed by: SURGERY

## 2024-05-06 PROCEDURE — 99213 OFFICE O/P EST LOW 20 MIN: CPT | Mod: PBBFAC,PN | Performed by: SURGERY

## 2024-05-06 NOTE — PROGRESS NOTES
SUBJECTIVE:    Mr. Almaraz is here today for a follow up visit.  He is presents for review his MRI and further assessment.  He has been in the boot for his non insertional Achilles tendinosis.  Presents for further follow up.  Going to [place] next week.        OBJECTIVE:      Vitals:    05/06/24 1139   Weight: 81.4 kg (179 lb 7.3 oz)   Height: 6' (1.829 m)   PainSc:   2       Lower Extremity Exam  Tenderness to palpation about the insertion of the calcaneus, as well as more proximally in the non insertional Achilles tendinosis region.  Tenderness about the FHL and sinus tarsi.  Fires FHL, EHL, TA, GSC.  Plantar flexion is mildly reduced 1st chondral side.  Neurovascularly intact.     DIAGNOSTIC STUDIES:  MRI reviewed.  Non insertional Achilles tendinosis with tearing of the Achilles, FHL tenosynovitis, os trigonum with increased signal intensity.    ASSESSMENT:   1. Non insertional Achilles tendinosis      PLAN:  There are no diagnoses linked to this encounter.    We will prescribe him physical therapy.  He would like to manage this nonoperatively.  He can follow up with us was in September.  She would still be having symptoms we may pursue a new MRI.  Discussed with the surgery would be debridement of the Achilles, possible versus probable FHL transfer, Edson's resection, ostectomy of the calcaneus and the os trigonum.  Follow up September.  Prescription given physical therapy.    Alen Jacobson MD  [facility]  Orthopedic Surgery      This note was done with voice recognition software. Please excuse any errors missed in proof reading.

## 2024-05-22 ENCOUNTER — PATIENT MESSAGE (OUTPATIENT)
Dept: ORTHOPEDICS | Facility: CLINIC | Age: 74
End: 2024-05-22
Payer: MEDICARE

## 2024-08-20 ENCOUNTER — E-VISIT (OUTPATIENT)
Dept: INTERNAL MEDICINE | Facility: CLINIC | Age: 74
End: 2024-08-20
Payer: MEDICARE

## 2024-08-20 DIAGNOSIS — Z29.89 ALTITUDE SICKNESS PROPHYLAXIS: Primary | ICD-10-CM

## 2024-08-23 RX ORDER — ACETAZOLAMIDE 500 MG/1
CAPSULE, EXTENDED RELEASE ORAL
Qty: 60 CAPSULE | Refills: 0 | Status: SHIPPED | OUTPATIENT
Start: 2024-08-23

## 2024-08-23 RX ORDER — DEXAMETHASONE 4 MG/1
TABLET ORAL
Qty: 20 TABLET | Refills: 0 | Status: SHIPPED | OUTPATIENT
Start: 2024-08-23

## 2024-08-23 NOTE — PROGRESS NOTES
Patient ID: Nilesh Almaraz is a 73 y.o. male.    Chief Complaint: General Illness (Entered automatically based on patient selection in [facility].)    The patient initiated a request through [employer] on 8/20/2024 for evaluation and management with a chief complaint of General Illness (Entered automatically based on patient selection in [employer].)     I evaluated the questionnaire submission on 8/23/24.    [facility] Evisit [facility]-Medication    8/20/2024 11:42 AM CDT - Filed by Patient   What do you need help with? Medication Request   Do you agree to participate in an E-Visit? Yes   If you have any of the following symptoms, please present to your local emergency room or call 911:  I acknowledge   Medication requests for narcotics will not be addressed via an E-Visit.  Please schedule an appointment. I acknowledge   Do you want to address a new or existing medication? I would like to start a new medication that I do not already take   What is the main issue you would like addressed today? Dexamethasibe for altitude sickness to be taken propylactically or precautionary. I will be in [facility] above [id number]for 10 days   What is the name of the medication that you would like to start? Dexamethosone   Have you taken a similar medication in the past? Yes   What was the name of the similar medication? Diamox   Why are you no longer on that medication? No specific reason    What medical condition is the  medication intended to treat? AMS   Provide any additional information you feel is important.    Please attach any relevant images or files    Are you able to take your vital signs? Yes   Systolic Blood Pressure: 125   Diastolic Blood Pressure: 70   Weight: 171   Height: 72   Pulse: 62   Temperature: 98   Respiration rate: 20   Pulse Oxygen: 99         Encounter Diagnosis   Name Primary?    Altitude sickness prophylaxis Yes        No orders of the defined types were placed in this encounter.     Medications Ordered  This Encounter   Medications    acetaZOLAMIDE (DIAMOX) 500 mg CpSR     Si tab PO BID starting 36 hrs before ascent and continue for 48 hrs at altitude     Dispense:  60 capsule     Refill:  0    dexAMETHasone (DECADRON) 4 MG Tab     Si tab PO q6 hrs if you develop altitude sickness     Dispense:  20 tablet     Refill:  0        No follow-ups on file.      E-Visit Time Tracking:    Day 1 Time (in minutes): 5    Total Time (in minutes): 5          A/P:  Altitude sickness prevention- Rx Diamox and Dexamethazone PRN

## 2024-09-15 DIAGNOSIS — Z29.89 ALTITUDE SICKNESS PROPHYLAXIS: ICD-10-CM

## 2024-09-16 RX ORDER — ACETAZOLAMIDE 500 MG/1
CAPSULE, EXTENDED RELEASE ORAL
Qty: 180 CAPSULE | Refills: 1 | Status: SHIPPED | OUTPATIENT
Start: 2024-09-16

## 2024-09-25 DIAGNOSIS — Z00.00 ENCOUNTER FOR MEDICARE ANNUAL WELLNESS EXAM: ICD-10-CM

## 2024-10-01 ENCOUNTER — PATIENT MESSAGE (OUTPATIENT)
Dept: INTERNAL MEDICINE | Facility: CLINIC | Age: 74
End: 2024-10-01
Payer: MEDICARE

## 2024-12-05 ENCOUNTER — PATIENT MESSAGE (OUTPATIENT)
Dept: UROLOGY | Facility: CLINIC | Age: 74
End: 2024-12-05
Payer: MEDICARE

## 2024-12-06 DIAGNOSIS — C61 PROSTATE CANCER: Primary | ICD-10-CM

## 2025-01-08 NOTE — PROGRESS NOTES
Clinic Note  1/8/2025      Subjective:         Chief Complaint:   HPI  Nilesh Almaraz is a 74 y.o. male with prostate cancer. Consult from Dr. Aguero.  Dr. Mcdonough did biopsy 2/9/2012- B9 path with acute and chronic inflammation (PSA 3.5).  Negative family history. 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 Has place in [place] since [place].  On flomax for BPH.  Underwent XRT in [place], [place] 07/2022.   Of note, the patient opted out of any androgen deprivation therapy around his external beam radiation.  He also reports opting for fewer treatments in higher dosages which is not what his radiation oncologist suggested.  However, on further discussion it sounds like this just sounds like hypofractionation.  He reports getting the same cumulative dose of radiation.      FH -negative  PSA - 8.4  Stage - T1C  Volume - 63 ccs  MRI - 3/16/2022- 1.4 cm LBPZ PI-RADS 3  Biopsy - 4/12/2022- Krystian 4+3 (GG3) right base 2/2 17%.80% Krystian 4. 2/14 +/   MARY ELLEN Score - 5 intermediate  NCCN - unfavorable intermediate  Germline testing -not indicated  Somatic testing - Prolaris score 2.8 ( 4.6 % METS rate at 10 years with AS, 3.1 % mets rate with single therapy at 10 years.     3/7/2023- PSA now 2.9 (3.1).    Still spending half his time in [place].   Erections: Very strong  LUTS: some mild urgency. Rare leakage. Colonoscopy next week.     10/20/2023- PSA- 1.5 (prior 2.9). [place] [facility] gone [facility]. [facility] has hospital in [place].    1/9/2025- PSA 1.0. Relocating permanently to [place] next month. Doing well on Tamsulosin. Reports some urgency, but is manageable.    Lab Results   Component Value Date    PSA 7.0 (H) 11/08/2018    PSA 4.7 (H) 05/22/2017    PSA 6.8 (H) 03/29/2016    PSA 5.0 (H) 10/16/2013    PSA 3.52 09/27/2012    PSA 3.4 08/04/2011    PSA 4.5 (H) 07/20/2011    PSADIAG 1.0 01/09/2025    PSADIAG 1.5 10/18/2023    PSADIAG 2.9 03/03/2023     PSADIAG 3.1 11/11/2022    PSADIAG 8.4 (H) 01/12/2022    PSADIAG 4.3 (H) 10/03/2016    PSATOTAL 3.5 08/04/2011    PSAFREE 0.83 08/04/2011    PSAFREEPCT 23.71 08/04/2011       Past Medical History:   Diagnosis Date    Allergy     Anxiety     BPH (benign prostatic hypertrophy)     Prediabetes     Primary osteoarthritis of first carpometacarpal joint of left hand 3/8/2023    Prostate cancer     Prostatism     Skin cancer      Family History   Problem Relation Name Age of Onset    Diabetes Mother      Cancer Brother          osteo sarcoma    Colon polyps Neg Hx      Heart disease Neg Hx      Amblyopia Neg Hx      Blindness Neg Hx      Cataracts Neg Hx      Glaucoma Neg Hx      Hypertension Neg Hx      Macular degeneration Neg Hx      Retinal detachment Neg Hx      Strabismus Neg Hx      Stroke Neg Hx      Thyroid disease Neg Hx       Social History     Socioeconomic History    Marital status:      Spouse name: Palmira    Number of children: 2   Occupational History    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iest     Employer: [employer]   Tobacco Use    Smoking status: Never    Smokeless tobacco: Never   Substance and Sexual Activity    Alcohol use: Yes     Alcohol/week: 2.0 standard drinks of alcohol     Types: 2 Glasses of wine per week     Comment: daily    Drug use: No    Sexual activity: Yes     Partners: Female     Social Drivers of Health     Financial Resource Strain: Low Risk  (1/26/2024)    Overall Financial Resource Strain (CARDIA)     Difficulty of Paying Living Expenses: Not hard at all   Food Insecurity: No Food Insecurity (1/26/2024)    Hunger Vital Sign     Worried About Running Out of Food in the Last Year: Never true     Ran Out of Food in the Last Year: Never true   Transportation Needs: No Transportation Needs (1/26/2024)    PRAPARE - Transportation     Lack of Transportation (Medical): No     Lack of Transportation (Non-Medical): No   Physical Activity: Unknown (1/26/2024)    Exercise Vital Sign     Days of Exercise  per Week: 3 days   Recent Concern: Physical Activity - Insufficiently Active (12/28/2023)    Exercise Vital Sign     Days of Exercise per Week: 2 days     Minutes of Exercise per Session: 60 min   Stress: Stress Concern Present (1/26/2024)    [employer] - Occupational Stress Questionnaire     Feeling of Stress : To some extent   Housing Stability: Low Risk  (1/26/2024)    Housing Stability Vital Sign     Unable to Pay for Housing in the Last Year: No     Number of Places Lived in the Last Year: 2     Unstable Housing in the Last Year: No     Past Surgical History:   Procedure Laterality Date    COLONOSCOPY N/A 3/23/2023    Procedure: COLONOSCOPY;  Surgeon: Conor Reid MD;  Location: [facility] ([address] [place] [address]);  Service: Endoscopy;  Laterality: N/A;  suprep-inst portal-tb  pre call done, pt requesting earlier time on day of if one becomes available- AJ  3/21-updated instructions sent to [facility], pt contacted for earlier arrival time-KPvt    FRACTURE SURGERY      left ankle    HERNIA REPAIR      hydrocele      SKIN LESION EXCISION       Patient Active Problem List   Diagnosis    Prostate cancer    Bradycardia    History of COVID-19 in Oct 2022    Hemorrhoids    Chronic left shoulder pain    Primary osteoarthritis of first carpometacarpal joint of left hand    Difficulty walking    Prediabetes     Review of Systems   Respiratory:  Negative for shortness of breath.    Cardiovascular:  Negative for chest pain.   Genitourinary:  Negative for dysuria and hematuria.   Musculoskeletal:  Negative for arthralgias.         Objective:      There were no vitals taken for this visit.  Estimated body mass index is 24.34 kg/m² as calculated from the following:    Height as of 5/6/24: 6' (1.829 m).    Weight as of 5/6/24: 81.4 kg (179 lb 7.3 oz).  Physical Exam  Genitourinary:     Prostate: Not enlarged and not tender.      Comments: No nodules or masses noted        Assessment and Plan:   History of  prostate cancer s/p XRT in 2022. Doing well. Moving to [place] permanently. Would like to find a urologist there.         Problem List Items Addressed This Visit       Prostate cancer - Primary       Follow up:   Patient will follow up with urologist in [place].    Roman Mcdonough

## 2025-01-09 ENCOUNTER — LAB VISIT (OUTPATIENT)
Dept: LAB | Facility: HOSPITAL | Age: 75
End: 2025-01-09
Attending: UROLOGY
Payer: MEDICARE

## 2025-01-09 ENCOUNTER — OFFICE VISIT (OUTPATIENT)
Dept: UROLOGY | Facility: CLINIC | Age: 75
End: 2025-01-09
Payer: MEDICARE

## 2025-01-09 VITALS
HEART RATE: 61 BPM | HEIGHT: 72 IN | BODY MASS INDEX: 23.35 KG/M2 | WEIGHT: 172.38 LBS | DIASTOLIC BLOOD PRESSURE: 64 MMHG | SYSTOLIC BLOOD PRESSURE: 122 MMHG

## 2025-01-09 DIAGNOSIS — C61 PROSTATE CANCER: Primary | ICD-10-CM

## 2025-01-09 DIAGNOSIS — C61 PROSTATE CANCER: ICD-10-CM

## 2025-01-09 LAB — COMPLEXED PSA SERPL-MCNC: 1 NG/ML (ref 0–4)

## 2025-01-09 PROCEDURE — 84153 ASSAY OF PSA TOTAL: CPT | Performed by: UROLOGY

## 2025-01-09 PROCEDURE — G2211 COMPLEX E/M VISIT ADD ON: HCPCS | Mod: S$PBB,,, | Performed by: UROLOGY

## 2025-01-09 PROCEDURE — 99214 OFFICE O/P EST MOD 30 MIN: CPT | Mod: S$PBB,,, | Performed by: UROLOGY

## 2025-01-09 PROCEDURE — 99999 PR PBB SHADOW E&M-EST. PATIENT-LVL III: CPT | Mod: PBBFAC,,, | Performed by: UROLOGY

## 2025-01-09 PROCEDURE — 99213 OFFICE O/P EST LOW 20 MIN: CPT | Mod: PBBFAC | Performed by: UROLOGY

## 2025-01-09 PROCEDURE — 36415 COLL VENOUS BLD VENIPUNCTURE: CPT | Performed by: UROLOGY

## 2025-01-09 RX ORDER — TAMSULOSIN HYDROCHLORIDE 0.4 MG/1
0.8 CAPSULE ORAL DAILY
Qty: 90 CAPSULE | Refills: 3 | Status: SHIPPED | OUTPATIENT
Start: 2025-01-09

## 2025-02-20 ENCOUNTER — PATIENT OUTREACH (OUTPATIENT)
Dept: ADMINISTRATIVE | Facility: HOSPITAL | Age: 75
End: 2025-02-20
Payer: MEDICARE

## 2025-02-21 DIAGNOSIS — Z00.00 ENCOUNTER FOR MEDICARE ANNUAL WELLNESS EXAM: ICD-10-CM

## 2025-06-19 RX ORDER — TAMSULOSIN HYDROCHLORIDE 0.4 MG/1
0.8 CAPSULE ORAL DAILY
Qty: 90 CAPSULE | Refills: 3 | Status: SHIPPED | OUTPATIENT
Start: 2025-06-19

## 2025-07-22 ENCOUNTER — TELEPHONE (OUTPATIENT)
Dept: INTERNAL MEDICINE | Facility: CLINIC | Age: 75
End: 2025-07-22
Payer: MEDICARE